# Patient Record
Sex: MALE | Race: WHITE | NOT HISPANIC OR LATINO | Employment: OTHER
[De-identification: names, ages, dates, MRNs, and addresses within clinical notes are randomized per-mention and may not be internally consistent; named-entity substitution may affect disease eponyms.]

---

## 2017-03-19 ENCOUNTER — HOSPITAL (OUTPATIENT)
Dept: OTHER | Age: 72
End: 2017-03-19
Attending: EMERGENCY MEDICINE

## 2017-03-19 LAB
ALBUMIN SERPL-MCNC: 3.7 GM/DL (ref 3.6–5.1)
ALP SERPL-CCNC: 120 UNIT/L (ref 45–117)
ALT SERPL-CCNC: 41 UNIT/L
ANALYZER ANC (IANC): NORMAL
ANION GAP SERPL CALC-SCNC: 8 MMOL/L (ref 10–20)
AST SERPL-CCNC: 38 UNIT/L
BASOPHILS # BLD: 0.1 THOUSAND/MCL (ref 0–0.3)
BASOPHILS NFR BLD: 2 %
BILIRUB CONJ SERPL-MCNC: 0.1 MG/DL (ref 0–0.2)
BILIRUB SERPL-MCNC: 0.6 MG/DL (ref 0.2–1)
BUN SERPL-MCNC: 11 MG/DL (ref 6–20)
BUN/CREAT SERPL: 12 (ref 7–25)
CALCIUM SERPL-MCNC: 9.1 MG/DL (ref 8.4–10.2)
CHLORIDE: 105 MMOL/L (ref 98–107)
CO2 SERPL-SCNC: 32 MMOL/L (ref 21–32)
CREAT SERPL-MCNC: 0.89 MG/DL (ref 0.67–1.17)
DIFFERENTIAL METHOD BLD: NORMAL
EOSINOPHIL # BLD: 0.1 THOUSAND/MCL (ref 0.1–0.5)
EOSINOPHIL NFR BLD: 2 %
ERYTHROCYTE [DISTWIDTH] IN BLOOD: 13.8 % (ref 11–15)
GLUCOSE SERPL-MCNC: 93 MG/DL (ref 65–99)
HEMATOCRIT: 43.8 % (ref 39–51)
HGB BLD-MCNC: 15 GM/DL (ref 13–17)
LYMPHOCYTES # BLD: 1.1 THOUSAND/MCL (ref 1–4)
LYMPHOCYTES NFR BLD: 24 %
MCH RBC QN AUTO: 30.3 PG (ref 26–34)
MCHC RBC AUTO-ENTMCNC: 34.2 GM/DL (ref 32–36.5)
MCV RBC AUTO: 88.5 FL (ref 78–100)
MONOCYTES # BLD: 0.4 THOUSAND/MCL (ref 0.3–0.9)
MONOCYTES NFR BLD: 9 %
NEUTROPHILS # BLD: 3 THOUSAND/MCL (ref 1.8–7.7)
NEUTROPHILS NFR BLD: 63 %
NEUTS SEG NFR BLD: NORMAL %
PERCENT NRBC: NORMAL
PLATELET # BLD: 278 THOUSAND/MCL (ref 140–450)
POTASSIUM SERPL-SCNC: 3.2 MMOL/L (ref 3.4–5.1)
PROT SERPL-MCNC: 7.8 GM/DL (ref 6.4–8.2)
RBC # BLD: 4.95 MILLION/MCL (ref 4.5–5.9)
SODIUM SERPL-SCNC: 142 MMOL/L (ref 135–145)
WBC # BLD: 4.7 THOUSAND/MCL (ref 4.2–11)

## 2017-03-20 LAB
AMORPH SED URNS QL MICRO: NORMAL
APPEARANCE UR: CLEAR
BACTERIA #/AREA URNS HPF: NORMAL /HPF
BILIRUB UR QL: NEGATIVE
CAOX CRY URNS QL MICRO: NORMAL
COLOR UR: YELLOW
EPITH CASTS #/AREA URNS LPF: NORMAL /[LPF]
FATTY CASTS #/AREA URNS LPF: NORMAL /[LPF]
GLUCOSE UR-MCNC: NEGATIVE MG/DL
GRAN CASTS #/AREA URNS LPF: NORMAL /[LPF]
HGB UR QL: NEGATIVE
HYALINE CASTS #/AREA URNS LPF: NORMAL /LPF (ref 0–5)
KETONES UR-MCNC: NEGATIVE MG/DL
LEUKOCYTE ESTERASE UR QL STRIP: NEGATIVE
MIXED CELL CASTS #/AREA URNS LPF: NORMAL /[LPF]
MUCOUS THREADS URNS QL MICRO: PRESENT
NITRITE UR QL: NEGATIVE
PH UR: 6 UNIT (ref 5–7)
PROT UR QL: NEGATIVE MG/DL
RBC #/AREA URNS HPF: NORMAL /HPF (ref 0–3)
RBC CASTS #/AREA URNS LPF: NORMAL /[LPF]
RENAL EPI CELLS #/AREA URNS HPF: NORMAL /[HPF]
SP GR UR: 1 (ref 1–1.03)
SPECIMEN SOURCE: NORMAL
SPERM URNS QL MICRO: NORMAL
SQUAMOUS #/AREA URNS HPF: NORMAL /HPF (ref 0–5)
T VAGINALIS URNS QL MICRO: NORMAL
TRI-PHOS CRY URNS QL MICRO: NORMAL
URATE CRY URNS QL MICRO: NORMAL
URINE REFLEX: NORMAL
URNS CMNT MICRO: NORMAL
UROBILINOGEN UR QL: 0.2 MG/DL (ref 0–1)
WAXY CASTS #/AREA URNS LPF: NORMAL /[LPF]
WBC #/AREA URNS HPF: NORMAL /HPF (ref 0–5)
WBC CASTS #/AREA URNS LPF: NORMAL /[LPF]
YEAST HYPHAE URNS QL MICRO: NORMAL
YEAST URNS QL MICRO: NORMAL

## 2020-01-05 ENCOUNTER — HOSPITAL (OUTPATIENT)
Dept: OTHER | Age: 75
End: 2020-01-05

## 2020-01-05 LAB
ANALYZER ANC (IANC): ABNORMAL
ANION GAP SERPL CALC-SCNC: 5 MMOL/L (ref 10–20)
BASOPHILS # BLD: 0.1 K/MCL (ref 0–0.3)
BASOPHILS NFR BLD: 1 %
BUN SERPL-MCNC: 8 MG/DL (ref 6–20)
BUN/CREAT SERPL: 9 (ref 7–25)
CALCIUM SERPL-MCNC: 8.2 MG/DL (ref 8.4–10.2)
CHLORIDE SERPL-SCNC: 108 MMOL/L (ref 98–107)
CO2 SERPL-SCNC: 32 MMOL/L (ref 21–32)
CREAT SERPL-MCNC: 0.84 MG/DL (ref 0.67–1.17)
DIFFERENTIAL METHOD BLD: ABNORMAL
EOSINOPHIL # BLD: 0.2 K/MCL (ref 0.1–0.5)
EOSINOPHIL NFR BLD: 4 %
ERYTHROCYTE [DISTWIDTH] IN BLOOD: 13.5 % (ref 11–15)
GLUCOSE SERPL-MCNC: 88 MG/DL (ref 65–99)
HCT VFR BLD CALC: 40.1 % (ref 39–51)
HGB BLD-MCNC: 12.7 G/DL (ref 13–17)
IMM GRANULOCYTES # BLD AUTO: 0 K/MCL (ref 0–0.2)
IMM GRANULOCYTES NFR BLD: 1 %
LYMPHOCYTES # BLD: 1.1 K/MCL (ref 1–4)
LYMPHOCYTES NFR BLD: 18 %
MCH RBC QN AUTO: 28.7 PG (ref 26–34)
MCHC RBC AUTO-ENTMCNC: 31.7 G/DL (ref 32–36.5)
MCV RBC AUTO: 90.7 FL (ref 78–100)
MONOCYTES # BLD: 0.6 K/MCL (ref 0.3–0.9)
MONOCYTES NFR BLD: 9 %
NEUTROPHILS # BLD: 3.9 K/MCL (ref 1.8–7.7)
NEUTROPHILS NFR BLD: 67 %
NEUTS SEG NFR BLD: ABNORMAL %
NRBC (NRBCRE): 0 /100 WBC
PLATELET # BLD: 225 K/MCL (ref 140–450)
POTASSIUM SERPL-SCNC: 3.8 MMOL/L (ref 3.4–5.1)
RBC # BLD: 4.42 MIL/MCL (ref 4.5–5.9)
SODIUM SERPL-SCNC: 141 MMOL/L (ref 135–145)
WBC # BLD: 5.9 K/MCL (ref 4.2–11)

## 2020-01-06 ENCOUNTER — HOSPITAL (OUTPATIENT)
Dept: OTHER | Age: 75
End: 2020-01-06

## 2020-01-06 LAB
ANALYZER ANC (IANC): ABNORMAL
BASOPHILS # BLD: 0.1 K/MCL (ref 0–0.3)
BASOPHILS NFR BLD: 1 %
CREAT SERPL-MCNC: 0.85 MG/DL (ref 0.67–1.17)
DIFFERENTIAL METHOD BLD: ABNORMAL
EOSINOPHIL # BLD: 0.2 K/MCL (ref 0.1–0.5)
EOSINOPHIL NFR BLD: 5 %
ERYTHROCYTE [DISTWIDTH] IN BLOOD: 13.6 % (ref 11–15)
ERYTHROCYTE [SEDIMENTATION RATE] IN BLOOD BY WESTERGREN METHOD: 30 MM/HR (ref 0–20)
HCT VFR BLD CALC: 36.9 % (ref 39–51)
HGB BLD-MCNC: 11.7 G/DL (ref 13–17)
IMM GRANULOCYTES # BLD AUTO: 0 K/MCL (ref 0–0.2)
IMM GRANULOCYTES NFR BLD: 0 %
LYMPHOCYTES # BLD: 0.8 K/MCL (ref 1–4)
LYMPHOCYTES NFR BLD: 17 %
MAGNESIUM SERPL-MCNC: 2.2 MG/DL (ref 1.7–2.4)
MCH RBC QN AUTO: 28.3 PG (ref 26–34)
MCHC RBC AUTO-ENTMCNC: 31.7 G/DL (ref 32–36.5)
MCV RBC AUTO: 89.1 FL (ref 78–100)
MONOCYTES # BLD: 0.6 K/MCL (ref 0.3–0.9)
MONOCYTES NFR BLD: 12 %
MRSA DNA SPEC QL NAA+PROBE: NORMAL
MRSA DNA SPEC QL NAA+PROBE: NORMAL
MRSA DNA SPEC QL NAA+PROBE: NOT DETECTED
NEUTROPHILS # BLD: 3.3 K/MCL (ref 1.8–7.7)
NEUTROPHILS NFR BLD: 65 %
NEUTS SEG NFR BLD: ABNORMAL %
NRBC (NRBCRE): 0 /100 WBC
PLATELET # BLD: 192 K/MCL (ref 140–450)
POTASSIUM SERPL-SCNC: 3.8 MMOL/L (ref 3.4–5.1)
RBC # BLD: 4.14 MIL/MCL (ref 4.5–5.9)
SPECIMEN SOURCE: NORMAL
WBC # BLD: 5 K/MCL (ref 4.2–11)

## 2020-01-07 LAB
CREAT SERPL-MCNC: 0.67 MG/DL (ref 0.67–1.17)
MAGNESIUM SERPL-MCNC: 2 MG/DL (ref 1.7–2.4)
POTASSIUM SERPL-SCNC: 3.8 MMOL/L (ref 3.4–5.1)

## 2020-01-07 PROCEDURE — 90792 PSYCH DIAG EVAL W/MED SRVCS: CPT | Performed by: PSYCHIATRY & NEUROLOGY

## 2020-01-08 LAB
CREAT SERPL-MCNC: 0.83 MG/DL (ref 0.67–1.17)
MAGNESIUM SERPL-MCNC: 2.2 MG/DL (ref 1.7–2.4)
POTASSIUM SERPL-SCNC: 3.9 MMOL/L (ref 3.4–5.1)

## 2020-01-09 LAB
25(OH)D3+25(OH)D2 SERPL-MCNC: 16.4 NG/ML (ref 30–100)
25(OH)D3+25(OH)D2 SERPL-MCNC: ABNORMAL NG/ML
FOLATE SERPL-MCNC: 12.2 NG/ML
T4 FREE SERPL-MCNC: 1 NG/DL (ref 0.8–1.5)
TSH SERPL-ACNC: 2.77 MCUNITS/ML (ref 0.35–5)
VIT B12 SERPL-MCNC: 504 PG/ML (ref 211–911)

## 2020-01-09 PROCEDURE — 99232 SBSQ HOSP IP/OBS MODERATE 35: CPT | Performed by: PSYCHIATRY & NEUROLOGY

## 2020-01-10 LAB
ALBUMIN SERPL-MCNC: 2.1 G/DL (ref 3.6–5.1)
ALBUMIN/GLOB SERPL: 0.5 {RATIO} (ref 1–2.4)
ALP SERPL-CCNC: 101 UNITS/L (ref 45–117)
ALT SERPL-CCNC: 27 UNITS/L
ANALYZER ANC (IANC): ABNORMAL
ANION GAP SERPL CALC-SCNC: 6 MMOL/L (ref 10–20)
AST SERPL-CCNC: 38 UNITS/L
BACTERIA BLD CULT: NORMAL
BASOPHILS # BLD: 0.1 K/MCL (ref 0–0.3)
BASOPHILS NFR BLD: 1 %
BILIRUB SERPL-MCNC: 0.3 MG/DL (ref 0.2–1)
BUN SERPL-MCNC: 23 MG/DL (ref 6–20)
BUN/CREAT SERPL: 29 (ref 7–25)
CALCIUM SERPL-MCNC: 8.5 MG/DL (ref 8.4–10.2)
CHLORIDE SERPL-SCNC: 105 MMOL/L (ref 98–107)
CO2 SERPL-SCNC: 30 MMOL/L (ref 21–32)
CREAT SERPL-MCNC: 0.81 MG/DL (ref 0.67–1.17)
DIFFERENTIAL METHOD BLD: ABNORMAL
EOSINOPHIL # BLD: 0.3 K/MCL (ref 0.1–0.5)
EOSINOPHIL NFR BLD: 5 %
ERYTHROCYTE [DISTWIDTH] IN BLOOD: 13.7 % (ref 11–15)
GLOBULIN SER-MCNC: 4 G/DL (ref 2–4)
GLUCOSE SERPL-MCNC: 100 MG/DL (ref 65–99)
HCT VFR BLD CALC: 36.7 % (ref 39–51)
HGB BLD-MCNC: 11.7 G/DL (ref 13–17)
IMM GRANULOCYTES # BLD AUTO: 0 K/MCL (ref 0–0.2)
IMM GRANULOCYTES NFR BLD: 0 %
LYMPHOCYTES # BLD: 1 K/MCL (ref 1–4)
LYMPHOCYTES NFR BLD: 15 %
MCH RBC QN AUTO: 28.8 PG (ref 26–34)
MCHC RBC AUTO-ENTMCNC: 31.9 G/DL (ref 32–36.5)
MCV RBC AUTO: 90.4 FL (ref 78–100)
MONOCYTES # BLD: 0.8 K/MCL (ref 0.3–0.9)
MONOCYTES NFR BLD: 11 %
NEUTROPHILS # BLD: 4.6 K/MCL (ref 1.8–7.7)
NEUTROPHILS NFR BLD: 68 %
NEUTS SEG NFR BLD: ABNORMAL %
NRBC (NRBCRE): 0 /100 WBC
PLATELET # BLD: 202 K/MCL (ref 140–450)
POTASSIUM SERPL-SCNC: 4.1 MMOL/L (ref 3.4–5.1)
PROT SERPL-MCNC: 6.1 G/DL (ref 6.4–8.2)
RBC # BLD: 4.06 MIL/MCL (ref 4.5–5.9)
SODIUM SERPL-SCNC: 137 MMOL/L (ref 135–145)
WBC # BLD: 6.7 K/MCL (ref 4.2–11)

## 2020-01-10 PROCEDURE — 99232 SBSQ HOSP IP/OBS MODERATE 35: CPT | Performed by: PSYCHIATRY & NEUROLOGY

## 2020-02-22 ENCOUNTER — HOSPITAL ENCOUNTER (INPATIENT)
Age: 75
LOS: 4 days | Discharge: NURSING, REHAB OR LONG-TERM CARE | DRG: 871 | End: 2020-02-26
Attending: EMERGENCY MEDICINE | Admitting: HOSPITALIST

## 2020-02-22 ENCOUNTER — APPOINTMENT (OUTPATIENT)
Dept: ULTRASOUND IMAGING | Age: 75
DRG: 871 | End: 2020-02-22
Attending: HOSPITALIST

## 2020-02-22 ENCOUNTER — APPOINTMENT (OUTPATIENT)
Dept: CT IMAGING | Age: 75
DRG: 871 | End: 2020-02-22
Attending: EMERGENCY MEDICINE

## 2020-02-22 ENCOUNTER — APPOINTMENT (OUTPATIENT)
Dept: GENERAL RADIOLOGY | Age: 75
DRG: 871 | End: 2020-02-22
Attending: EMERGENCY MEDICINE

## 2020-02-22 DIAGNOSIS — G30.8 ALZHEIMER'S DISEASE OF OTHER ONSET WITHOUT BEHAVIORAL DISTURBANCE: Chronic | ICD-10-CM

## 2020-02-22 DIAGNOSIS — L03.115 CELLULITIS OF RIGHT LOWER EXTREMITY: Primary | ICD-10-CM

## 2020-02-22 DIAGNOSIS — J10.1 INFLUENZA A WITH RESPIRATORY MANIFESTATIONS: ICD-10-CM

## 2020-02-22 DIAGNOSIS — J69.0 ASPIRATION PNEUMONIA OF BOTH LOWER LOBES DUE TO REGURGITATED FOOD (CMD): ICD-10-CM

## 2020-02-22 DIAGNOSIS — R65.20 SEVERE SEPSIS (CMD): ICD-10-CM

## 2020-02-22 DIAGNOSIS — A41.9 SEVERE SEPSIS (CMD): ICD-10-CM

## 2020-02-22 DIAGNOSIS — F02.80 ALZHEIMER'S DISEASE OF OTHER ONSET WITHOUT BEHAVIORAL DISTURBANCE: Chronic | ICD-10-CM

## 2020-02-22 LAB
ALBUMIN SERPL-MCNC: 3 G/DL (ref 3.6–5.1)
ALBUMIN/GLOB SERPL: 0.7 {RATIO} (ref 1–2.4)
ALP SERPL-CCNC: 117 UNITS/L (ref 45–117)
ALT SERPL-CCNC: 80 UNITS/L
ANION GAP SERPL CALC-SCNC: 13 MMOL/L (ref 10–20)
APPEARANCE UR: ABNORMAL
APTT PPP: 30 SEC (ref 22–32)
AST SERPL-CCNC: 99 UNITS/L
BACTERIA #/AREA URNS HPF: ABNORMAL /HPF
BASE DEFICIT BLDV-SCNC: 1 MMOL/L (ref 0–2)
BASOPHILS # BLD AUTO: 0 K/MCL (ref 0–0.3)
BASOPHILS NFR BLD AUTO: 0 %
BILIRUB SERPL-MCNC: 0.5 MG/DL (ref 0.2–1)
BILIRUB UR QL STRIP: NEGATIVE
BODY TEMPERATURE: 37 DEGREES
BUN SERPL-MCNC: 21 MG/DL (ref 6–20)
BUN/CREAT SERPL: 21 (ref 7–25)
CALCIUM SERPL-MCNC: 8.4 MG/DL (ref 8.4–10.2)
CHLORIDE SERPL-SCNC: 106 MMOL/L (ref 98–107)
CO2 SERPL-SCNC: 24 MMOL/L (ref 21–32)
COHGB MFR BLD: 1.2 %
COLOR UR: YELLOW
CONDITION-RC: ABNORMAL
CREAT SERPL-MCNC: 1.02 MG/DL (ref 0.67–1.17)
DIFFERENTIAL METHOD BLD: ABNORMAL
EOSINOPHIL # BLD AUTO: 0 K/MCL (ref 0.1–0.5)
EOSINOPHIL NFR SPEC: 0 %
ERYTHROCYTE [DISTWIDTH] IN BLOOD: 13.7 % (ref 11–15)
GLOBULIN SER-MCNC: 4.3 G/DL (ref 2–4)
GLUCOSE BLD-MCNC: 114 MG/DL (ref 65–99)
GLUCOSE SERPL-MCNC: 113 MG/DL (ref 65–99)
GLUCOSE UR STRIP-MCNC: NEGATIVE MG/DL
HCO3 BLDV-SCNC: 24 MMOL/L (ref 22–28)
HCT VFR BLD CALC: 41 % (ref 39–51)
HCT VFR BLD CALC: 41 % (ref 39–51)
HGB BLD-MCNC: 13.3 G/DL (ref 13–17)
HGB BLD-MCNC: 13.8 G/DL (ref 13–17)
HGB UR QL STRIP: ABNORMAL
HYALINE CASTS #/AREA URNS LPF: ABNORMAL /LPF (ref 0–5)
IMM GRANULOCYTES # BLD AUTO: 0.1 K/MCL (ref 0–0.2)
IMM GRANULOCYTES NFR BLD: 1 %
INR PPP: 1.1
KETONES UR STRIP-MCNC: NEGATIVE MG/DL
LACTATE BLDV-MCNC: 2.9 MMOL/L
LACTATE BLDV-MCNC: 5.5 MMOL/L
LEUKOCYTE ESTERASE UR QL STRIP: NEGATIVE
LYMPHOCYTES # BLD MANUAL: 0.4 K/MCL (ref 1–4)
LYMPHOCYTES NFR BLD MANUAL: 4 %
MCH RBC QN AUTO: 28.8 PG (ref 26–34)
MCHC RBC AUTO-ENTMCNC: 32.4 G/DL (ref 32–36.5)
MCV RBC AUTO: 88.7 FL (ref 78–100)
METHGB MFR BLD: 1 %
MONOCYTES # BLD MANUAL: 0.9 K/MCL (ref 0.3–0.9)
MONOCYTES NFR BLD MANUAL: 10 %
MUCOUS THREADS URNS QL MICRO: PRESENT
NEUTROPHILS # BLD: 7.9 K/MCL (ref 1.8–7.7)
NEUTROPHILS NFR BLD AUTO: 85 %
NITRITE UR QL STRIP: NEGATIVE
NRBC BLD MANUAL-RTO: 0 /100 WBC
NT-PROBNP SERPL-MCNC: 893 PG/ML
OXYHGB MFR BLD: 55.1 % (ref 94–98)
PCO2 BLDV: 41 MM HG (ref 41–54)
PH BLDV: 7.38 UNITS (ref 7.35–7.45)
PH UR STRIP: 5 UNITS (ref 5–7)
PLATELET # BLD: 150 K/MCL (ref 140–450)
PO2 BLDV: 33 MM HG (ref 35–42)
POTASSIUM BLD-SCNC: 3.9 MMOL/L (ref 3.4–5.1)
POTASSIUM SERPL-SCNC: 3.7 MMOL/L (ref 3.4–5.1)
PROT SERPL-MCNC: 7.3 G/DL (ref 6.4–8.2)
PROT UR STRIP-MCNC: 30 MG/DL
PROTHROMBIN TIME: 11.9 SEC (ref 9.7–11.8)
RBC # BLD: 4.62 MIL/MCL (ref 4.5–5.9)
RBC #/AREA URNS HPF: ABNORMAL /HPF (ref 0–2)
SAO2 % BLDV: 56 % (ref 60–80)
SAO2% DEVICE MEASUREMENT SITE: ABNORMAL
SODIUM BLD-SCNC: 139 MMOL/L (ref 135–145)
SODIUM SERPL-SCNC: 139 MMOL/L (ref 135–145)
SP GR UR STRIP: 1.02 (ref 1–1.03)
SPECIMEN SOURCE: ABNORMAL
SQUAMOUS #/AREA URNS HPF: ABNORMAL /HPF (ref 0–5)
TROPONIN I SERPL-MCNC: <0.02 NG/ML
UROBILINOGEN UR STRIP-MCNC: 0.2 MG/DL (ref 0–1)
WBC # BLD: 9.2 K/MCL (ref 4.2–11)
WBC #/AREA URNS HPF: ABNORMAL /HPF (ref 0–5)

## 2020-02-22 PROCEDURE — 36415 COLL VENOUS BLD VENIPUNCTURE: CPT

## 2020-02-22 PROCEDURE — 96374 THER/PROPH/DIAG INJ IV PUSH: CPT

## 2020-02-22 PROCEDURE — 82947 ASSAY GLUCOSE BLOOD QUANT: CPT

## 2020-02-22 PROCEDURE — 10002800 HB RX 250 W HCPCS

## 2020-02-22 PROCEDURE — 93005 ELECTROCARDIOGRAM TRACING: CPT | Performed by: EMERGENCY MEDICINE

## 2020-02-22 PROCEDURE — 84295 ASSAY OF SERUM SODIUM: CPT

## 2020-02-22 PROCEDURE — 10002803 HB RX 637: Performed by: EMERGENCY MEDICINE

## 2020-02-22 PROCEDURE — 84132 ASSAY OF SERUM POTASSIUM: CPT

## 2020-02-22 PROCEDURE — 71045 X-RAY EXAM CHEST 1 VIEW: CPT

## 2020-02-22 PROCEDURE — 96375 TX/PRO/DX INJ NEW DRUG ADDON: CPT

## 2020-02-22 PROCEDURE — 84484 ASSAY OF TROPONIN QUANT: CPT

## 2020-02-22 PROCEDURE — 80053 COMPREHEN METABOLIC PANEL: CPT

## 2020-02-22 PROCEDURE — 10002800 HB RX 250 W HCPCS: Performed by: EMERGENCY MEDICINE

## 2020-02-22 PROCEDURE — 87040 BLOOD CULTURE FOR BACTERIA: CPT

## 2020-02-22 PROCEDURE — 99292 CRITICAL CARE ADDL 30 MIN: CPT | Performed by: HOSPITALIST

## 2020-02-22 PROCEDURE — 81001 URINALYSIS AUTO W/SCOPE: CPT

## 2020-02-22 PROCEDURE — 85610 PROTHROMBIN TIME: CPT

## 2020-02-22 PROCEDURE — 99291 CRITICAL CARE FIRST HOUR: CPT

## 2020-02-22 PROCEDURE — 70450 CT HEAD/BRAIN W/O DYE: CPT

## 2020-02-22 PROCEDURE — 83605 ASSAY OF LACTIC ACID: CPT

## 2020-02-22 PROCEDURE — 73070 X-RAY EXAM OF ELBOW: CPT

## 2020-02-22 PROCEDURE — 85025 COMPLETE CBC W/AUTO DIFF WBC: CPT

## 2020-02-22 PROCEDURE — 10003585 HB ROOM CHARGE INTERMEDIATE CARE

## 2020-02-22 PROCEDURE — 96361 HYDRATE IV INFUSION ADD-ON: CPT

## 2020-02-22 PROCEDURE — 93971 EXTREMITY STUDY: CPT

## 2020-02-22 PROCEDURE — 85730 THROMBOPLASTIN TIME PARTIAL: CPT

## 2020-02-22 PROCEDURE — 83050 HGB METHEMOGLOBIN QUAN: CPT

## 2020-02-22 PROCEDURE — 10004281 HB COUNTER-STAFF TIME PER 15 MIN

## 2020-02-22 PROCEDURE — 83880 ASSAY OF NATRIURETIC PEPTIDE: CPT

## 2020-02-22 PROCEDURE — 10002807 HB RX 258: Performed by: EMERGENCY MEDICINE

## 2020-02-22 RX ORDER — ERGOCALCIFEROL 1.25 MG/1
1.25 CAPSULE ORAL
COMMUNITY

## 2020-02-22 RX ORDER — CEFAZOLIN SODIUM/WATER 1 G/10 ML
1000 SYRINGE (ML) INTRAVENOUS DAILY
Status: DISCONTINUED | OUTPATIENT
Start: 2020-02-23 | End: 2020-02-23

## 2020-02-22 RX ORDER — ONDANSETRON 2 MG/ML
4 INJECTION INTRAMUSCULAR; INTRAVENOUS EVERY 6 HOURS PRN
Status: DISCONTINUED | OUTPATIENT
Start: 2020-02-22 | End: 2020-02-26 | Stop reason: HOSPADM

## 2020-02-22 RX ORDER — ONDANSETRON 2 MG/ML
4 INJECTION INTRAMUSCULAR; INTRAVENOUS ONCE
Status: COMPLETED | OUTPATIENT
Start: 2020-02-22 | End: 2020-02-22

## 2020-02-22 RX ORDER — ONDANSETRON 2 MG/ML
INJECTION INTRAMUSCULAR; INTRAVENOUS
Status: COMPLETED
Start: 2020-02-22 | End: 2020-02-22

## 2020-02-22 RX ORDER — ACETAMINOPHEN 500 MG
1000 TABLET ORAL ONCE
Status: DISCONTINUED | OUTPATIENT
Start: 2020-02-22 | End: 2020-02-22

## 2020-02-22 RX ORDER — ACETAMINOPHEN 325 MG/1
650 TABLET ORAL EVERY 4 HOURS PRN
Status: DISCONTINUED | OUTPATIENT
Start: 2020-02-22 | End: 2020-02-26 | Stop reason: HOSPADM

## 2020-02-22 RX ORDER — CEFAZOLIN SODIUM/WATER 2 G/20 ML
2000 SYRINGE (ML) INTRAVENOUS ONCE
Status: COMPLETED | OUTPATIENT
Start: 2020-02-22 | End: 2020-02-22

## 2020-02-22 RX ORDER — 0.9 % SODIUM CHLORIDE 0.9 %
2 VIAL (ML) INJECTION EVERY 12 HOURS SCHEDULED
Status: DISCONTINUED | OUTPATIENT
Start: 2020-02-22 | End: 2020-02-26 | Stop reason: HOSPADM

## 2020-02-22 RX ORDER — ENOXAPARIN SODIUM 100 MG/ML
40 INJECTION SUBCUTANEOUS DAILY
Status: DISCONTINUED | OUTPATIENT
Start: 2020-02-23 | End: 2020-02-26 | Stop reason: HOSPADM

## 2020-02-22 RX ORDER — ACETAMINOPHEN 650 MG/1
650 SUPPOSITORY RECTAL ONCE
Status: COMPLETED | OUTPATIENT
Start: 2020-02-22 | End: 2020-02-22

## 2020-02-22 RX ORDER — POLYETHYLENE GLYCOL 3350 17 G/17G
17 POWDER, FOR SOLUTION ORAL DAILY
Status: DISCONTINUED | OUTPATIENT
Start: 2020-02-23 | End: 2020-02-26 | Stop reason: HOSPADM

## 2020-02-22 RX ORDER — PANTOPRAZOLE SODIUM 40 MG/1
40 TABLET, DELAYED RELEASE ORAL DAILY
COMMUNITY

## 2020-02-22 RX ORDER — SODIUM CHLORIDE 9 MG/ML
INJECTION, SOLUTION INTRAVENOUS CONTINUOUS
Status: DISCONTINUED | OUTPATIENT
Start: 2020-02-22 | End: 2020-02-24

## 2020-02-22 RX ADMIN — ACETAMINOPHEN 650 MG: 650 SUPPOSITORY RECTAL at 15:56

## 2020-02-22 RX ADMIN — ONDANSETRON 4 MG: 2 INJECTION INTRAMUSCULAR; INTRAVENOUS at 15:39

## 2020-02-22 RX ADMIN — CEFTRIAXONE SODIUM 2000 MG: 100 INJECTION, POWDER, FOR SOLUTION INTRAVENOUS at 15:38

## 2020-02-22 RX ADMIN — SODIUM CHLORIDE, SODIUM LACTATE, POTASSIUM CHLORIDE, AND CALCIUM CHLORIDE 500 ML: .6; .31; .03; .02 INJECTION, SOLUTION INTRAVENOUS at 17:51

## 2020-02-22 RX ADMIN — ACETAMINOPHEN 325 MG: 325 SUPPOSITORY RECTAL at 15:56

## 2020-02-22 RX ADMIN — SODIUM CHLORIDE, POTASSIUM CHLORIDE, SODIUM LACTATE AND CALCIUM CHLORIDE 1000 ML: 600; 310; 30; 20 INJECTION, SOLUTION INTRAVENOUS at 17:00

## 2020-02-22 RX ADMIN — SODIUM CHLORIDE, SODIUM LACTATE, POTASSIUM CHLORIDE, AND CALCIUM CHLORIDE 1000 ML: .6; .31; .03; .02 INJECTION, SOLUTION INTRAVENOUS at 15:38

## 2020-02-22 SDOH — HEALTH STABILITY: MENTAL HEALTH: HOW OFTEN DO YOU HAVE A DRINK CONTAINING ALCOHOL?: NEVER

## 2020-02-22 ASSESSMENT — PAIN SCALES - PAIN ASSESSMENT IN ADVANCED DEMENTIA (PAINAD)
FACIALEXPRESSION: SMILING OR INEXPRESSIVE
BODYLANGUAGE: RELAXED
TOTALSCORE: 0
CONSOLABILITY: NO NEED TO CONSOLE
BREATHING: NORMAL

## 2020-02-22 ASSESSMENT — LIFESTYLE VARIABLES
AUDIT-C TOTAL SCORE: 0
HOW MANY STANDARD DRINKS CONTAINING ALCOHOL DO YOU HAVE ON A TYPICAL DAY: 0,1 OR 2
ALCOHOL_USE_STATUS: NO OR LOW RISK WITH VALIDATED TOOL
HOW OFTEN DO YOU HAVE A DRINK CONTAINING ALCOHOL: NEVER
HOW OFTEN DO YOU HAVE 6 OR MORE DRINKS ON ONE OCCASION: NEVER

## 2020-02-22 ASSESSMENT — ACTIVITIES OF DAILY LIVING (ADL)
ADL_SCORE: 6
DRESSING YOURSELF: NEEDS ASSISTANCE
CHRONIC_PAIN_PRESENT: UNABLE TO ASSESS
TRANSFERRING: NEEDS ASSISTANCE
MOBILITY_ASSIST_DEVICES: TRANSFER MAT/PAD/SHEET
TOILETING: NEEDS ASSISTANCE
CONTINENCE: NEEDS ASSISTANCE
FEEDING YOURSELF: NEEDS ASSISTANCE
ADL_BEFORE_ADMISSION: NEEDS/REQUIRES ASSISTANCE
RECENT_DECLINE_ADL: YES, DECLINE IN AMBULATION/TRANSFERRING, COLLABORATE WITH PROVIDER (T)
BATHING: NEEDS ASSISTANCE
ADL_SHORT_OF_BREATH: NO

## 2020-02-22 ASSESSMENT — COGNITIVE AND FUNCTIONAL STATUS - GENERAL
ARE YOU DEAF OR DO YOU HAVE SERIOUS DIFFICULTY  HEARING: NO
ARE YOU BLIND OR DO YOU HAVE SERIOUS DIFFICULTY SEEING, EVEN WHEN WEARING GLASSES: NO

## 2020-02-22 ASSESSMENT — PATIENT HEALTH QUESTIONNAIRE - PHQ9: IS PATIENT ABLE TO COMPLETE PHQ2 OR PHQ9: NO, DEFER TO LATER TIME

## 2020-02-23 ENCOUNTER — APPOINTMENT (OUTPATIENT)
Dept: CT IMAGING | Age: 75
DRG: 871 | End: 2020-02-23
Attending: HOSPITALIST

## 2020-02-23 PROBLEM — J69.0 ASPIRATION PNEUMONIA (CMD): Status: ACTIVE | Noted: 2020-02-23

## 2020-02-23 PROBLEM — R65.20 SEVERE SEPSIS (CMD): Status: ACTIVE | Noted: 2020-02-23

## 2020-02-23 PROBLEM — R74.01 TRANSAMINITIS: Status: ACTIVE | Noted: 2020-02-23

## 2020-02-23 PROBLEM — A41.9 SEVERE SEPSIS (CMD): Status: ACTIVE | Noted: 2020-02-23

## 2020-02-23 PROBLEM — E86.0 DEHYDRATION: Status: ACTIVE | Noted: 2020-02-23

## 2020-02-23 PROBLEM — G93.41 ACUTE METABOLIC ENCEPHALOPATHY: Status: ACTIVE | Noted: 2020-02-23

## 2020-02-23 PROBLEM — F03.90 DEMENTIA (CMD): Chronic | Status: ACTIVE | Noted: 2020-02-23

## 2020-02-23 PROBLEM — R13.10 DYSPHAGIA: Status: ACTIVE | Noted: 2020-02-23

## 2020-02-23 LAB
ALBUMIN SERPL-MCNC: 2.2 G/DL (ref 3.6–5.1)
ALP SERPL-CCNC: 86 UNITS/L (ref 45–117)
ALT SERPL-CCNC: 112 UNITS/L
ANION GAP SERPL CALC-SCNC: 10 MMOL/L (ref 10–20)
AST SERPL-CCNC: 438 UNITS/L
BASOPHILS # BLD AUTO: 0 K/MCL (ref 0–0.3)
BASOPHILS NFR BLD AUTO: 0 %
BILIRUB CONJ SERPL-MCNC: 0.1 MG/DL (ref 0–0.2)
BILIRUB SERPL-MCNC: 0.3 MG/DL (ref 0.2–1)
BUN SERPL-MCNC: 18 MG/DL (ref 6–20)
BUN/CREAT SERPL: 22 (ref 7–25)
CALCIUM SERPL-MCNC: 8.2 MG/DL (ref 8.4–10.2)
CHLORIDE SERPL-SCNC: 110 MMOL/L (ref 98–107)
CK SERPL-CCNC: ABNORMAL UNITS/L (ref 39–308)
CO2 SERPL-SCNC: 27 MMOL/L (ref 21–32)
CREAT SERPL-MCNC: 0.8 MG/DL (ref 0.67–1.17)
D DIMER PPP FEU-MCNC: 2.22 MG/L (FEU)
DIFFERENTIAL METHOD BLD: ABNORMAL
EOSINOPHIL # BLD AUTO: 0 K/MCL (ref 0.1–0.5)
EOSINOPHIL NFR SPEC: 0 %
ERYTHROCYTE [DISTWIDTH] IN BLOOD: 14 % (ref 11–15)
FLUAV RNA SPEC QL NAA+PROBE: DETECTED
FLUBV RNA SPEC QL NAA+PROBE: NOT DETECTED
GLUCOSE SERPL-MCNC: 93 MG/DL (ref 65–99)
HCT VFR BLD CALC: 35.2 % (ref 39–51)
HGB BLD-MCNC: 11.4 G/DL (ref 13–17)
IMM GRANULOCYTES # BLD AUTO: 0 K/MCL (ref 0–0.2)
IMM GRANULOCYTES NFR BLD: 1 %
LACTATE SERPL-SCNC: 1.1 MMOL/L (ref 0–2)
LYMPHOCYTES # BLD MANUAL: 0.8 K/MCL (ref 1–4)
LYMPHOCYTES NFR BLD MANUAL: 12 %
MCH RBC QN AUTO: 28.6 PG (ref 26–34)
MCHC RBC AUTO-ENTMCNC: 32.4 G/DL (ref 32–36.5)
MCV RBC AUTO: 88.2 FL (ref 78–100)
MONOCYTES # BLD MANUAL: 0.8 K/MCL (ref 0.3–0.9)
MONOCYTES NFR BLD MANUAL: 11 %
MRSA DNA SPEC QL NAA+PROBE: NOT DETECTED
NEUTROPHILS # BLD: 5.4 K/MCL (ref 1.8–7.7)
NEUTROPHILS NFR BLD AUTO: 76 %
NRBC BLD MANUAL-RTO: 0 /100 WBC
PLATELET # BLD: 102 K/MCL (ref 140–450)
POTASSIUM SERPL-SCNC: 3.6 MMOL/L (ref 3.4–5.1)
PROCALCITONIN SERPL-MCNC: 1.14 NG/ML
PROT SERPL-MCNC: 5.6 G/DL (ref 6.4–8.2)
RBC # BLD: 3.99 MIL/MCL (ref 4.5–5.9)
SODIUM SERPL-SCNC: 143 MMOL/L (ref 135–145)
SPECIMEN SOURCE: ABNORMAL
SPECIMEN SOURCE: NORMAL
WBC # BLD: 7 K/MCL (ref 4.2–11)

## 2020-02-23 PROCEDURE — 80076 HEPATIC FUNCTION PANEL: CPT

## 2020-02-23 PROCEDURE — 85025 COMPLETE CBC W/AUTO DIFF WBC: CPT

## 2020-02-23 PROCEDURE — 10003585 HB ROOM CHARGE INTERMEDIATE CARE

## 2020-02-23 PROCEDURE — 80048 BASIC METABOLIC PNL TOTAL CA: CPT

## 2020-02-23 PROCEDURE — 10004651 HB RX, NO CHARGE ITEM: Performed by: EMERGENCY MEDICINE

## 2020-02-23 PROCEDURE — 5A09357 ASSISTANCE WITH RESPIRATORY VENTILATION, LESS THAN 24 CONSECUTIVE HOURS, CONTINUOUS POSITIVE AIRWAY PRESSURE: ICD-10-PCS | Performed by: HOSPITALIST

## 2020-02-23 PROCEDURE — 87633 RESP VIRUS 12-25 TARGETS: CPT

## 2020-02-23 PROCEDURE — 87641 MR-STAPH DNA AMP PROBE: CPT

## 2020-02-23 PROCEDURE — 10002807 HB RX 258: Performed by: HOSPITALIST

## 2020-02-23 PROCEDURE — 10002805 HB CONTRAST AGENT: Performed by: HOSPITALIST

## 2020-02-23 PROCEDURE — 74177 CT ABD & PELVIS W/CONTRAST: CPT

## 2020-02-23 PROCEDURE — 84145 PROCALCITONIN (PCT): CPT

## 2020-02-23 PROCEDURE — 10002800 HB RX 250 W HCPCS: Performed by: HOSPITALIST

## 2020-02-23 PROCEDURE — 87502 INFLUENZA DNA AMP PROBE: CPT

## 2020-02-23 PROCEDURE — 10002807 HB RX 258

## 2020-02-23 PROCEDURE — 83605 ASSAY OF LACTIC ACID: CPT

## 2020-02-23 PROCEDURE — 82550 ASSAY OF CK (CPK): CPT

## 2020-02-23 PROCEDURE — 85379 FIBRIN DEGRADATION QUANT: CPT

## 2020-02-23 PROCEDURE — 92610 EVALUATE SWALLOWING FUNCTION: CPT

## 2020-02-23 PROCEDURE — 36415 COLL VENOUS BLD VENIPUNCTURE: CPT

## 2020-02-23 RX ORDER — OSELTAMIVIR PHOSPHATE 75 MG/1
75 CAPSULE ORAL EVERY 12 HOURS SCHEDULED
Status: DISCONTINUED | OUTPATIENT
Start: 2020-02-23 | End: 2020-02-26 | Stop reason: HOSPADM

## 2020-02-23 RX ORDER — SODIUM CHLORIDE 9 MG/ML
INJECTION, SOLUTION INTRAVENOUS
Status: COMPLETED
Start: 2020-02-23 | End: 2020-02-26

## 2020-02-23 RX ADMIN — IOHEXOL 100 ML: 350 INJECTION, SOLUTION INTRAVENOUS at 18:00

## 2020-02-23 RX ADMIN — SODIUM CHLORIDE: 0.9 INJECTION, SOLUTION INTRAVENOUS at 01:15

## 2020-02-23 RX ADMIN — SODIUM CHLORIDE: 900 INJECTION INTRAVENOUS at 17:00

## 2020-02-23 RX ADMIN — SODIUM CHLORIDE, PRESERVATIVE FREE 2 ML: 5 INJECTION INTRAVENOUS at 10:36

## 2020-02-23 RX ADMIN — PIPERACILLIN AND TAZOBACTAM 3.38 G: 3; .375 INJECTION, POWDER, LYOPHILIZED, FOR SOLUTION INTRAVENOUS at 22:11

## 2020-02-23 RX ADMIN — SODIUM CHLORIDE, PRESERVATIVE FREE 2 ML: 5 INJECTION INTRAVENOUS at 01:07

## 2020-02-23 RX ADMIN — PIPERACILLIN AND TAZOBACTAM 3.38 G: 3; .375 INJECTION, POWDER, LYOPHILIZED, FOR SOLUTION INTRAVENOUS at 17:26

## 2020-02-23 RX ADMIN — ENOXAPARIN SODIUM 40 MG: 40 INJECTION SUBCUTANEOUS at 09:20

## 2020-02-23 ASSESSMENT — PAIN SCALES - GENERAL
PAINLEVEL_OUTOF10: 0
PAINLEVEL_OUTOF10: 0

## 2020-02-24 ENCOUNTER — APPOINTMENT (OUTPATIENT)
Dept: CT IMAGING | Age: 75
DRG: 871 | End: 2020-02-24
Attending: HOSPITALIST

## 2020-02-24 PROBLEM — J10.1 INFLUENZA A WITH RESPIRATORY MANIFESTATIONS: Status: ACTIVE | Noted: 2020-02-24

## 2020-02-24 PROBLEM — K59.00 CONSTIPATION: Status: ACTIVE | Noted: 2020-02-24

## 2020-02-24 LAB
ALBUMIN SERPL-MCNC: 2.1 G/DL (ref 3.6–5.1)
ALBUMIN/GLOB SERPL: 0.7 {RATIO} (ref 1–2.4)
ALP SERPL-CCNC: 85 UNITS/L (ref 45–117)
ALT SERPL-CCNC: 140 UNITS/L
ANION GAP SERPL CALC-SCNC: 11 MMOL/L (ref 10–20)
ANNOTATION COMMENT IMP: NORMAL
ANNOTATION COMMENT IMP: NORMAL
AST SERPL-CCNC: 600 UNITS/L
ATRIAL RATE (BPM): 78
BASOPHILS # BLD AUTO: 0 K/MCL (ref 0–0.3)
BASOPHILS NFR BLD AUTO: 0 %
BILIRUB SERPL-MCNC: 0.5 MG/DL (ref 0.2–1)
BUN SERPL-MCNC: 16 MG/DL (ref 6–20)
BUN/CREAT SERPL: 21 (ref 7–25)
C PNEUM DNA SPEC QL NAA+PROBE: NOT DETECTED
CALCIUM SERPL-MCNC: 7.7 MG/DL (ref 8.4–10.2)
CHLORIDE SERPL-SCNC: 107 MMOL/L (ref 98–107)
CO2 SERPL-SCNC: 25 MMOL/L (ref 21–32)
CREAT SERPL-MCNC: 0.76 MG/DL (ref 0.67–1.17)
DIFFERENTIAL METHOD BLD: ABNORMAL
EOSINOPHIL # BLD AUTO: 0 K/MCL (ref 0.1–0.5)
EOSINOPHIL NFR SPEC: 0 %
ERYTHROCYTE [DISTWIDTH] IN BLOOD: 14.1 % (ref 11–15)
FLUAV H1 2009 PAND RNA NPH QL NAA+PROBE: POSITIVE
FLUAV H1 RNA NPH QL NAA+PROBE: NOT DETECTED
FLUAV H3 RNA NPH QL NAA+PROBE: NOT DETECTED
FLUAV RNA NPH QL NAA+PROBE: ABNORMAL
FLUBV RNA NPH QL NAA+PROBE: NOT DETECTED
GLOBULIN SER-MCNC: 3.2 G/DL (ref 2–4)
GLUCOSE SERPL-MCNC: 72 MG/DL (ref 65–99)
HADV DNA NPH QL NAA+PROBE: NOT DETECTED
HAV IGM SER QL: NEGATIVE
HBOV DNA SPEC QL NAA+PROBE: NOT DETECTED
HBV CORE IGM SER QL: NEGATIVE
HBV SURFACE AG SER QL: NEGATIVE
HCOV 229E RNA SPEC QL NAA+PROBE: NOT DETECTED
HCOV HKU1 RNA SPEC QL NAA+PROBE: NOT DETECTED
HCOV NL63 RNA SPEC QL NAA+PROBE: NOT DETECTED
HCOV OC43 RNA SPEC QL NAA+PROBE: NOT DETECTED
HCT VFR BLD CALC: 36.3 % (ref 39–51)
HCV AB SER QL: NEGATIVE
HGB BLD-MCNC: 11.4 G/DL (ref 13–17)
HMPV RNA NPH QL NAA+PROBE: NOT DETECTED
HPIV1 RNA NPH QL NAA+PROBE: NOT DETECTED
HPIV2 RNA NPH QL NAA+PROBE: NOT DETECTED
HPIV3 RNA NPH QL NAA+PROBE: NOT DETECTED
HPIV4 RNA NPH QL NAA+PROBE: NOT DETECTED
LYMPHOCYTES # BLD MANUAL: 0.7 K/MCL (ref 1–4)
LYMPHOCYTES NFR BLD MANUAL: 15 %
M PNEUMO DNA SPEC QL NAA+PROBE: NOT DETECTED
MCH RBC QN AUTO: 27.9 PG (ref 26–34)
MCHC RBC AUTO-ENTMCNC: 31.4 G/DL (ref 32–36.5)
MCV RBC AUTO: 89 FL (ref 78–100)
MONOCYTES # BLD MANUAL: 0.5 K/MCL (ref 0.3–0.9)
MONOCYTES NFR BLD MANUAL: 12 %
NEUTROPHILS # BLD: 3.2 K/MCL (ref 1.8–7.7)
NEUTROPHILS NFR BLD AUTO: 73 %
NRBC BLD MANUAL-RTO: 0 /100 WBC
P AXIS (DEGREES): 52
PLATELET # BLD: 102 K/MCL (ref 140–450)
POTASSIUM SERPL-SCNC: 3.5 MMOL/L (ref 3.4–5.1)
PR-INTERVAL (MSEC): 196
PROT SERPL-MCNC: 5.3 G/DL (ref 6.4–8.2)
QRS-INTERVAL (MSEC): 142
QT-INTERVAL (MSEC): 408
QTC: 462
R AXIS (DEGREES): -40
RBC # BLD: 4.08 MIL/MCL (ref 4.5–5.9)
REPORT TEXT: NORMAL
RSV A RNA NPH QL NAA+PROBE: NOT DETECTED
RSV B RNA NPH QL NAA+PROBE: NOT DETECTED
RV+EV RNA SPEC QL NAA+PROBE: NOT DETECTED
SODIUM SERPL-SCNC: 139 MMOL/L (ref 135–145)
SPECIMEN SOURCE: ABNORMAL
T AXIS (DEGREES): 19
VENTRICULAR RATE EKG/MIN (BPM): 77
WBC # BLD: 4.3 K/MCL (ref 4.2–11)

## 2020-02-24 PROCEDURE — 10002807 HB RX 258: Performed by: HOSPITALIST

## 2020-02-24 PROCEDURE — 10002803 HB RX 637: Performed by: HOSPITALIST

## 2020-02-24 PROCEDURE — 36415 COLL VENOUS BLD VENIPUNCTURE: CPT

## 2020-02-24 PROCEDURE — 92526 ORAL FUNCTION THERAPY: CPT

## 2020-02-24 PROCEDURE — 10003585 HB ROOM CHARGE INTERMEDIATE CARE

## 2020-02-24 PROCEDURE — 10002800 HB RX 250 W HCPCS: Performed by: HOSPITALIST

## 2020-02-24 PROCEDURE — 10002805 HB CONTRAST AGENT: Performed by: HOSPITALIST

## 2020-02-24 PROCEDURE — 97166 OT EVAL MOD COMPLEX 45 MIN: CPT

## 2020-02-24 PROCEDURE — 85025 COMPLETE CBC W/AUTO DIFF WBC: CPT

## 2020-02-24 PROCEDURE — 94660 CPAP INITIATION&MGMT: CPT

## 2020-02-24 PROCEDURE — 10004651 HB RX, NO CHARGE ITEM: Performed by: EMERGENCY MEDICINE

## 2020-02-24 PROCEDURE — 80053 COMPREHEN METABOLIC PANEL: CPT

## 2020-02-24 PROCEDURE — 80074 ACUTE HEPATITIS PANEL: CPT

## 2020-02-24 PROCEDURE — 71275 CT ANGIOGRAPHY CHEST: CPT

## 2020-02-24 RX ORDER — MAGNESIUM SULFATE 1 G/100ML
1 INJECTION INTRAVENOUS DAILY PRN
Status: DISCONTINUED | OUTPATIENT
Start: 2020-02-24 | End: 2020-02-26 | Stop reason: HOSPADM

## 2020-02-24 RX ORDER — FUROSEMIDE 10 MG/ML
20 INJECTION INTRAMUSCULAR; INTRAVENOUS ONCE
Status: COMPLETED | OUTPATIENT
Start: 2020-02-24 | End: 2020-02-25

## 2020-02-24 RX ORDER — POTASSIUM CHLORIDE 20 MEQ/1
20 TABLET, EXTENDED RELEASE ORAL EVERY 4 HOURS PRN
Status: DISCONTINUED | OUTPATIENT
Start: 2020-02-24 | End: 2020-02-26 | Stop reason: HOSPADM

## 2020-02-24 RX ORDER — POTASSIUM CHLORIDE 14.9 MG/ML
20 INJECTION INTRAVENOUS EVERY 4 HOURS PRN
Status: DISCONTINUED | OUTPATIENT
Start: 2020-02-24 | End: 2020-02-26 | Stop reason: HOSPADM

## 2020-02-24 RX ORDER — POTASSIUM CHLORIDE 1.5 G/1.58G
40 POWDER, FOR SOLUTION ORAL EVERY 4 HOURS PRN
Status: DISCONTINUED | OUTPATIENT
Start: 2020-02-24 | End: 2020-02-26 | Stop reason: HOSPADM

## 2020-02-24 RX ORDER — POTASSIUM CHLORIDE 1.5 G/1.58G
20 POWDER, FOR SOLUTION ORAL EVERY 4 HOURS PRN
Status: DISCONTINUED | OUTPATIENT
Start: 2020-02-24 | End: 2020-02-26 | Stop reason: HOSPADM

## 2020-02-24 RX ORDER — LACTULOSE 10 G/15ML
30 SOLUTION ORAL ONCE
Status: COMPLETED | OUTPATIENT
Start: 2020-02-24 | End: 2020-02-24

## 2020-02-24 RX ORDER — POTASSIUM CHLORIDE 20 MEQ/1
40 TABLET, EXTENDED RELEASE ORAL EVERY 4 HOURS PRN
Status: DISCONTINUED | OUTPATIENT
Start: 2020-02-24 | End: 2020-02-26 | Stop reason: HOSPADM

## 2020-02-24 RX ADMIN — OSELTAMIVIR PHOSPHATE 75 MG: 75 CAPSULE ORAL at 12:28

## 2020-02-24 RX ADMIN — SODIUM CHLORIDE 1000 ML: 0.9 INJECTION, SOLUTION INTRAVENOUS at 15:42

## 2020-02-24 RX ADMIN — POTASSIUM CHLORIDE 20 MEQ: 1500 TABLET, EXTENDED RELEASE ORAL at 15:29

## 2020-02-24 RX ADMIN — PIPERACILLIN AND TAZOBACTAM 3.38 G: 3; .375 INJECTION, POWDER, LYOPHILIZED, FOR SOLUTION INTRAVENOUS at 05:37

## 2020-02-24 RX ADMIN — SODIUM CHLORIDE: 0.9 INJECTION, SOLUTION INTRAVENOUS at 05:38

## 2020-02-24 RX ADMIN — ENOXAPARIN SODIUM 40 MG: 40 INJECTION SUBCUTANEOUS at 08:29

## 2020-02-24 RX ADMIN — PIPERACILLIN AND TAZOBACTAM 3.38 G: 3; .375 INJECTION, POWDER, LYOPHILIZED, FOR SOLUTION INTRAVENOUS at 22:31

## 2020-02-24 RX ADMIN — SODIUM CHLORIDE, PRESERVATIVE FREE 2 ML: 5 INJECTION INTRAVENOUS at 08:29

## 2020-02-24 RX ADMIN — POLYETHYLENE GLYCOL 3350 17 G: 17 POWDER, FOR SOLUTION ORAL at 12:28

## 2020-02-24 RX ADMIN — OSELTAMIVIR PHOSPHATE 75 MG: 75 CAPSULE ORAL at 22:25

## 2020-02-24 RX ADMIN — IOHEXOL 80 ML: 350 INJECTION, SOLUTION INTRAVENOUS at 21:00

## 2020-02-24 RX ADMIN — LACTULOSE 30 G: 10 SOLUTION ORAL at 15:29

## 2020-02-24 RX ADMIN — SODIUM CHLORIDE, PRESERVATIVE FREE 2 ML: 5 INJECTION INTRAVENOUS at 22:33

## 2020-02-24 RX ADMIN — PIPERACILLIN AND TAZOBACTAM 3.38 G: 3; .375 INJECTION, POWDER, LYOPHILIZED, FOR SOLUTION INTRAVENOUS at 15:37

## 2020-02-24 ASSESSMENT — COGNITIVE AND FUNCTIONAL STATUS - GENERAL
HELP NEEDED FOR BATHING: A LOT
HELP NEEDED FOR PERSONAL GROOMING: A LOT
HELP NEEDED DRESSING REGULAR UPPER BODY CLOTHING: A LOT
DAILY_ACTIVITY_RAW_SCORE: 13
HELP NEEDED FOR TOILETING: A LOT
HELP NEEDED DRESSING REGULAR LOWER BODY CLOTHING: A LOT
DAILY_ACTIVITY_CONVERTED_SCORE: 32.03

## 2020-02-24 ASSESSMENT — ACTIVITIES OF DAILY LIVING (ADL): PRIOR_ADL: MINIMAL ASSIST (MIN)

## 2020-02-24 ASSESSMENT — PAIN SCALES - GENERAL
PAINLEVEL_OUTOF10: 0
PAINLEVEL_OUTOF10: 0

## 2020-02-25 ENCOUNTER — APPOINTMENT (OUTPATIENT)
Dept: GENERAL RADIOLOGY | Age: 75
DRG: 871 | End: 2020-02-25
Attending: HOSPITALIST

## 2020-02-25 ENCOUNTER — CASE MANAGEMENT (OUTPATIENT)
Dept: CARE COORDINATION | Age: 75
End: 2020-02-25

## 2020-02-25 LAB
ALBUMIN SERPL-MCNC: 2.2 G/DL (ref 3.6–5.1)
ALBUMIN SERPL-MCNC: 2.3 G/DL (ref 3.6–5.1)
ALBUMIN/GLOB SERPL: 0.6 {RATIO} (ref 1–2.4)
ALBUMIN/GLOB SERPL: 0.6 {RATIO} (ref 1–2.4)
ALP SERPL-CCNC: 91 UNITS/L (ref 45–117)
ALP SERPL-CCNC: 97 UNITS/L (ref 45–117)
ALT SERPL-CCNC: 152 UNITS/L
ALT SERPL-CCNC: 152 UNITS/L
ANION GAP SERPL CALC-SCNC: 9 MMOL/L (ref 10–20)
ANION GAP SERPL CALC-SCNC: 9 MMOL/L (ref 10–20)
AST SERPL-CCNC: 530 UNITS/L
AST SERPL-CCNC: 594 UNITS/L
BASOPHILS # BLD AUTO: 0 K/MCL (ref 0–0.3)
BASOPHILS NFR BLD AUTO: 1 %
BILIRUB SERPL-MCNC: 0.5 MG/DL (ref 0.2–1)
BILIRUB SERPL-MCNC: 0.5 MG/DL (ref 0.2–1)
BUN SERPL-MCNC: 12 MG/DL (ref 6–20)
BUN SERPL-MCNC: 14 MG/DL (ref 6–20)
BUN/CREAT SERPL: 15 (ref 7–25)
BUN/CREAT SERPL: 18 (ref 7–25)
CALCIUM SERPL-MCNC: 7.9 MG/DL (ref 8.4–10.2)
CALCIUM SERPL-MCNC: 7.9 MG/DL (ref 8.4–10.2)
CHLORIDE SERPL-SCNC: 105 MMOL/L (ref 98–107)
CHLORIDE SERPL-SCNC: 107 MMOL/L (ref 98–107)
CO2 SERPL-SCNC: 28 MMOL/L (ref 21–32)
CO2 SERPL-SCNC: 28 MMOL/L (ref 21–32)
CREAT SERPL-MCNC: 0.79 MG/DL (ref 0.67–1.17)
CREAT SERPL-MCNC: 0.8 MG/DL (ref 0.67–1.17)
DIFFERENTIAL METHOD BLD: ABNORMAL
EOSINOPHIL # BLD AUTO: 0 K/MCL (ref 0.1–0.5)
EOSINOPHIL NFR SPEC: 0 %
ERYTHROCYTE [DISTWIDTH] IN BLOOD: 13.7 % (ref 11–15)
GLOBULIN SER-MCNC: 3.5 G/DL (ref 2–4)
GLOBULIN SER-MCNC: 3.7 G/DL (ref 2–4)
GLUCOSE SERPL-MCNC: 118 MG/DL (ref 65–99)
GLUCOSE SERPL-MCNC: 88 MG/DL (ref 65–99)
HCT VFR BLD CALC: 37.6 % (ref 39–51)
HGB BLD-MCNC: 12 G/DL (ref 13–17)
IMM GRANULOCYTES # BLD AUTO: 0 K/MCL (ref 0–0.2)
IMM GRANULOCYTES NFR BLD: 0 %
LYMPHOCYTES # BLD MANUAL: 0.6 K/MCL (ref 1–4)
LYMPHOCYTES NFR BLD MANUAL: 17 %
MCH RBC QN AUTO: 27.6 PG (ref 26–34)
MCHC RBC AUTO-ENTMCNC: 31.9 G/DL (ref 32–36.5)
MCV RBC AUTO: 86.6 FL (ref 78–100)
MONOCYTES # BLD MANUAL: 0.5 K/MCL (ref 0.3–0.9)
MONOCYTES NFR BLD MANUAL: 14 %
NEUTROPHILS # BLD: 2.4 K/MCL (ref 1.8–7.7)
NEUTROPHILS NFR BLD AUTO: 68 %
NRBC BLD MANUAL-RTO: 0 /100 WBC
PLATELET # BLD: 110 K/MCL (ref 140–450)
POTASSIUM SERPL-SCNC: 3.2 MMOL/L (ref 3.4–5.1)
POTASSIUM SERPL-SCNC: 3.3 MMOL/L (ref 3.4–5.1)
POTASSIUM SERPL-SCNC: 3.3 MMOL/L (ref 3.4–5.1)
PROT SERPL-MCNC: 5.7 G/DL (ref 6.4–8.2)
PROT SERPL-MCNC: 6 G/DL (ref 6.4–8.2)
RBC # BLD: 4.34 MIL/MCL (ref 4.5–5.9)
SODIUM SERPL-SCNC: 139 MMOL/L (ref 135–145)
SODIUM SERPL-SCNC: 141 MMOL/L (ref 135–145)
WBC # BLD: 3.5 K/MCL (ref 4.2–11)

## 2020-02-25 PROCEDURE — 80053 COMPREHEN METABOLIC PANEL: CPT

## 2020-02-25 PROCEDURE — 10002803 HB RX 637: Performed by: HOSPITALIST

## 2020-02-25 PROCEDURE — 10002800 HB RX 250 W HCPCS: Performed by: HOSPITALIST

## 2020-02-25 PROCEDURE — 94660 CPAP INITIATION&MGMT: CPT

## 2020-02-25 PROCEDURE — 97162 PT EVAL MOD COMPLEX 30 MIN: CPT

## 2020-02-25 PROCEDURE — 10003585 HB ROOM CHARGE INTERMEDIATE CARE

## 2020-02-25 PROCEDURE — 84132 ASSAY OF SERUM POTASSIUM: CPT

## 2020-02-25 PROCEDURE — 74230 X-RAY XM SWLNG FUNCJ C+: CPT

## 2020-02-25 PROCEDURE — 85025 COMPLETE CBC W/AUTO DIFF WBC: CPT

## 2020-02-25 PROCEDURE — 36415 COLL VENOUS BLD VENIPUNCTURE: CPT

## 2020-02-25 PROCEDURE — 10002807 HB RX 258: Performed by: HOSPITALIST

## 2020-02-25 PROCEDURE — 92611 MOTION FLUOROSCOPY/SWALLOW: CPT

## 2020-02-25 PROCEDURE — 10004651 HB RX, NO CHARGE ITEM: Performed by: EMERGENCY MEDICINE

## 2020-02-25 RX ORDER — POTASSIUM CHLORIDE 1.5 G/1.58G
20 POWDER, FOR SOLUTION ORAL EVERY 4 HOURS PRN
Status: DISCONTINUED | OUTPATIENT
Start: 2020-02-25 | End: 2020-02-25

## 2020-02-25 RX ORDER — POTASSIUM CHLORIDE 20 MEQ/1
40 TABLET, EXTENDED RELEASE ORAL EVERY 4 HOURS PRN
Status: DISCONTINUED | OUTPATIENT
Start: 2020-02-25 | End: 2020-02-25

## 2020-02-25 RX ORDER — MAGNESIUM SULFATE 1 G/100ML
1 INJECTION INTRAVENOUS DAILY PRN
Status: DISCONTINUED | OUTPATIENT
Start: 2020-02-25 | End: 2020-02-25

## 2020-02-25 RX ORDER — POTASSIUM CHLORIDE 20 MEQ/1
20 TABLET, EXTENDED RELEASE ORAL EVERY 4 HOURS PRN
Status: DISCONTINUED | OUTPATIENT
Start: 2020-02-25 | End: 2020-02-25

## 2020-02-25 RX ORDER — POTASSIUM CHLORIDE 14.9 MG/ML
20 INJECTION INTRAVENOUS EVERY 4 HOURS PRN
Status: DISCONTINUED | OUTPATIENT
Start: 2020-02-25 | End: 2020-02-25

## 2020-02-25 RX ORDER — POTASSIUM CHLORIDE 1.5 G/1.58G
40 POWDER, FOR SOLUTION ORAL EVERY 4 HOURS PRN
Status: DISCONTINUED | OUTPATIENT
Start: 2020-02-25 | End: 2020-02-25

## 2020-02-25 RX ADMIN — POTASSIUM CHLORIDE 40 MEQ: 1500 TABLET, EXTENDED RELEASE ORAL at 17:45

## 2020-02-25 RX ADMIN — PIPERACILLIN AND TAZOBACTAM 3.38 G: 3; .375 INJECTION, POWDER, LYOPHILIZED, FOR SOLUTION INTRAVENOUS at 06:31

## 2020-02-25 RX ADMIN — OSELTAMIVIR PHOSPHATE 75 MG: 75 CAPSULE ORAL at 10:57

## 2020-02-25 RX ADMIN — OSELTAMIVIR PHOSPHATE 75 MG: 75 CAPSULE ORAL at 21:21

## 2020-02-25 RX ADMIN — ENOXAPARIN SODIUM 40 MG: 40 INJECTION SUBCUTANEOUS at 10:56

## 2020-02-25 RX ADMIN — SODIUM CHLORIDE, PRESERVATIVE FREE 2 ML: 5 INJECTION INTRAVENOUS at 10:58

## 2020-02-25 RX ADMIN — POTASSIUM CHLORIDE 40 MEQ: 1.5 POWDER, FOR SOLUTION ORAL at 10:58

## 2020-02-25 RX ADMIN — PIPERACILLIN AND TAZOBACTAM 3.38 G: 3; .375 INJECTION, POWDER, LYOPHILIZED, FOR SOLUTION INTRAVENOUS at 21:37

## 2020-02-25 RX ADMIN — PIPERACILLIN AND TAZOBACTAM 3.38 G: 3; .375 INJECTION, POWDER, LYOPHILIZED, FOR SOLUTION INTRAVENOUS at 15:28

## 2020-02-25 RX ADMIN — SODIUM CHLORIDE, PRESERVATIVE FREE 2 ML: 5 INJECTION INTRAVENOUS at 21:21

## 2020-02-25 RX ADMIN — POLYETHYLENE GLYCOL 3350 17 G: 17 POWDER, FOR SOLUTION ORAL at 10:57

## 2020-02-25 RX ADMIN — FUROSEMIDE 20 MG: 10 INJECTION, SOLUTION INTRAMUSCULAR; INTRAVENOUS at 00:01

## 2020-02-25 ASSESSMENT — COGNITIVE AND FUNCTIONAL STATUS - GENERAL
BASIC_MOBILITY_CONVERTED_SCORE: 38.32
BASIC_MOBILITY_RAW_SCORE: 16

## 2020-02-25 ASSESSMENT — PAIN SCALES - GENERAL
PAINLEVEL_OUTOF10: 0
PAINLEVEL_OUTOF10: 0

## 2020-02-26 ENCOUNTER — TRANSCRIBE ORDERS (OUTPATIENT)
Dept: CARDIOLOGY | Age: 75
End: 2020-02-26

## 2020-02-26 VITALS
RESPIRATION RATE: 12 BRPM | OXYGEN SATURATION: 95 % | WEIGHT: 167.11 LBS | DIASTOLIC BLOOD PRESSURE: 74 MMHG | SYSTOLIC BLOOD PRESSURE: 131 MMHG | TEMPERATURE: 98.8 F | HEIGHT: 72 IN | HEART RATE: 66 BPM | BODY MASS INDEX: 22.63 KG/M2

## 2020-02-26 LAB
ALBUMIN SERPL-MCNC: 2.1 G/DL (ref 3.6–5.1)
ALBUMIN/GLOB SERPL: 0.6 {RATIO} (ref 1–2.4)
ALP SERPL-CCNC: 92 UNITS/L (ref 45–117)
ALT SERPL-CCNC: 130 UNITS/L
ANION GAP SERPL CALC-SCNC: 8 MMOL/L (ref 10–20)
AST SERPL-CCNC: 406 UNITS/L
BASOPHILS # BLD AUTO: 0 K/MCL (ref 0–0.3)
BASOPHILS NFR BLD AUTO: 0 %
BILIRUB SERPL-MCNC: 0.5 MG/DL (ref 0.2–1)
BUN SERPL-MCNC: 12 MG/DL (ref 6–20)
BUN/CREAT SERPL: 15 (ref 7–25)
CALCIUM SERPL-MCNC: 7.9 MG/DL (ref 8.4–10.2)
CHLORIDE SERPL-SCNC: 105 MMOL/L (ref 98–107)
CO2 SERPL-SCNC: 30 MMOL/L (ref 21–32)
CREAT SERPL-MCNC: 0.78 MG/DL (ref 0.67–1.17)
DIFFERENTIAL METHOD BLD: ABNORMAL
EOSINOPHIL # BLD AUTO: 0 K/MCL (ref 0.1–0.5)
EOSINOPHIL NFR SPEC: 1 %
ERYTHROCYTE [DISTWIDTH] IN BLOOD: 13.8 % (ref 11–15)
GLOBULIN SER-MCNC: 3.6 G/DL (ref 2–4)
GLUCOSE SERPL-MCNC: 92 MG/DL (ref 65–99)
HCT VFR BLD CALC: 37.9 % (ref 39–51)
HGB BLD-MCNC: 12.1 G/DL (ref 13–17)
IMM GRANULOCYTES # BLD AUTO: 0 K/MCL (ref 0–0.2)
IMM GRANULOCYTES NFR BLD: 0 %
LYMPHOCYTES # BLD MANUAL: 0.8 K/MCL (ref 1–4)
LYMPHOCYTES NFR BLD MANUAL: 24 %
MCH RBC QN AUTO: 27.9 PG (ref 26–34)
MCHC RBC AUTO-ENTMCNC: 31.9 G/DL (ref 32–36.5)
MCV RBC AUTO: 87.5 FL (ref 78–100)
MONOCYTES # BLD MANUAL: 0.5 K/MCL (ref 0.3–0.9)
MONOCYTES NFR BLD MANUAL: 15 %
NEUTROPHILS # BLD: 1.9 K/MCL (ref 1.8–7.7)
NEUTROPHILS NFR BLD AUTO: 60 %
NRBC BLD MANUAL-RTO: 0 /100 WBC
PLATELET # BLD: 104 K/MCL (ref 140–450)
POTASSIUM SERPL-SCNC: 3.8 MMOL/L (ref 3.4–5.1)
POTASSIUM SERPL-SCNC: 4.2 MMOL/L (ref 3.4–5.1)
PROT SERPL-MCNC: 5.7 G/DL (ref 6.4–8.2)
RBC # BLD: 4.33 MIL/MCL (ref 4.5–5.9)
SODIUM SERPL-SCNC: 139 MMOL/L (ref 135–145)
WBC # BLD: 3.2 K/MCL (ref 4.2–11)

## 2020-02-26 PROCEDURE — 94660 CPAP INITIATION&MGMT: CPT

## 2020-02-26 PROCEDURE — 97530 THERAPEUTIC ACTIVITIES: CPT

## 2020-02-26 PROCEDURE — 10002803 HB RX 637: Performed by: HOSPITALIST

## 2020-02-26 PROCEDURE — 10004651 HB RX, NO CHARGE ITEM: Performed by: EMERGENCY MEDICINE

## 2020-02-26 PROCEDURE — 80053 COMPREHEN METABOLIC PANEL: CPT

## 2020-02-26 PROCEDURE — 10002807 HB RX 258: Performed by: HOSPITALIST

## 2020-02-26 PROCEDURE — 10002800 HB RX 250 W HCPCS: Performed by: HOSPITALIST

## 2020-02-26 PROCEDURE — 85025 COMPLETE CBC W/AUTO DIFF WBC: CPT

## 2020-02-26 PROCEDURE — 36415 COLL VENOUS BLD VENIPUNCTURE: CPT

## 2020-02-26 RX ORDER — POLYETHYLENE GLYCOL 3350 17 G/17G
17 POWDER, FOR SOLUTION ORAL DAILY PRN
Qty: 255 G | Refills: 0 | Status: SHIPPED
Start: 2020-02-26

## 2020-02-26 RX ORDER — AMOXICILLIN AND CLAVULANATE POTASSIUM 875; 125 MG/1; MG/1
1 TABLET, FILM COATED ORAL 2 TIMES DAILY
Qty: 20 TABLET | Refills: 0 | Status: SHIPPED | OUTPATIENT
Start: 2020-02-26 | End: 2020-03-07

## 2020-02-26 RX ORDER — ONDANSETRON 4 MG/1
4 TABLET, ORALLY DISINTEGRATING ORAL EVERY 8 HOURS PRN
Qty: 12 TABLET | Refills: 0 | Status: SHIPPED
Start: 2020-02-26 | End: 2020-03-09

## 2020-02-26 RX ORDER — AMOXICILLIN 250 MG
1 CAPSULE ORAL 2 TIMES DAILY
Qty: 60 TABLET | Refills: 0 | Status: SHIPPED | OUTPATIENT
Start: 2020-02-26

## 2020-02-26 RX ORDER — OSELTAMIVIR PHOSPHATE 75 MG/1
75 CAPSULE ORAL EVERY 12 HOURS SCHEDULED
Qty: 6 CAPSULE | Refills: 0 | Status: SHIPPED | OUTPATIENT
Start: 2020-02-26

## 2020-02-26 RX ADMIN — ENOXAPARIN SODIUM 40 MG: 40 INJECTION SUBCUTANEOUS at 08:21

## 2020-02-26 RX ADMIN — POTASSIUM CHLORIDE 20 MEQ: 1500 TABLET, EXTENDED RELEASE ORAL at 08:21

## 2020-02-26 RX ADMIN — SODIUM CHLORIDE, PRESERVATIVE FREE 2 ML: 5 INJECTION INTRAVENOUS at 08:19

## 2020-02-26 RX ADMIN — PIPERACILLIN AND TAZOBACTAM 3.38 G: 3; .375 INJECTION, POWDER, LYOPHILIZED, FOR SOLUTION INTRAVENOUS at 06:08

## 2020-02-26 RX ADMIN — POLYETHYLENE GLYCOL 3350 17 G: 17 POWDER, FOR SOLUTION ORAL at 08:19

## 2020-02-26 RX ADMIN — OSELTAMIVIR PHOSPHATE 75 MG: 75 CAPSULE ORAL at 08:21

## 2020-02-26 ASSESSMENT — COGNITIVE AND FUNCTIONAL STATUS - GENERAL
HELP NEEDED FOR PERSONAL GROOMING: A LITTLE
HELP NEEDED FOR BATHING: A LOT
BASIC_MOBILITY_CONVERTED_SCORE: 39.67
HELP NEEDED DRESSING REGULAR LOWER BODY CLOTHING: A LOT
BASIC_MOBILITY_RAW_SCORE: 17
DAILY_ACTIVITY_CONVERTED_SCORE: 34.69
HELP NEEDED DRESSING REGULAR UPPER BODY CLOTHING: A LITTLE
DAILY_ACTIVITY_RAW_SCORE: 15
HELP NEEDED FOR TOILETING: A LOT

## 2020-02-26 ASSESSMENT — PAIN SCALES - GENERAL: PAINLEVEL_OUTOF10: 0

## 2020-02-27 LAB
BACTERIA BLD CULT: NORMAL
BACTERIA BLD CULT: NORMAL
REPORT STATUS (RPT): NORMAL
REPORT STATUS (RPT): NORMAL
SPECIMEN SOURCE: NORMAL
SPECIMEN SOURCE: NORMAL

## 2020-03-14 ENCOUNTER — CASE MANAGEMENT (OUTPATIENT)
Dept: CARE COORDINATION | Age: 75
End: 2020-03-14

## 2020-03-20 ENCOUNTER — TRANSCRIBE ORDERS (OUTPATIENT)
Dept: POST ACUTE CARE | Age: 75
End: 2020-03-20

## 2020-03-20 DIAGNOSIS — Z86.19 HISTORY OF SEPSIS: ICD-10-CM

## 2020-03-20 DIAGNOSIS — J10.00 PNEUMONIA DUE TO INFLUENZA A VIRUS, UNSPECIFIED LATERALITY, UNSPECIFIED PART OF LUNG: Primary | ICD-10-CM

## 2020-03-20 DIAGNOSIS — F03.90 DEMENTIA WITHOUT BEHAVIORAL DISTURBANCE, UNSPECIFIED DEMENTIA TYPE: ICD-10-CM

## 2020-03-20 DIAGNOSIS — R53.1 WEAKNESS: ICD-10-CM

## 2020-03-23 ENCOUNTER — CASE MANAGEMENT (OUTPATIENT)
Dept: CARE COORDINATION | Age: 75
End: 2020-03-23

## 2020-10-16 ENCOUNTER — APPOINTMENT (OUTPATIENT)
Dept: CT IMAGING | Facility: HOSPITAL | Age: 75
End: 2020-10-16
Attending: EMERGENCY MEDICINE
Payer: MEDICARE

## 2020-10-16 ENCOUNTER — HOSPITAL ENCOUNTER (EMERGENCY)
Facility: HOSPITAL | Age: 75
Discharge: HOME | End: 2020-10-16
Attending: EMERGENCY MEDICINE
Payer: MEDICARE

## 2020-10-16 VITALS
SYSTOLIC BLOOD PRESSURE: 128 MMHG | DIASTOLIC BLOOD PRESSURE: 87 MMHG | HEART RATE: 68 BPM | RESPIRATION RATE: 18 BRPM | TEMPERATURE: 98 F | OXYGEN SATURATION: 98 %

## 2020-10-16 DIAGNOSIS — S01.81XA LACERATION OF FOREHEAD, INITIAL ENCOUNTER: Primary | ICD-10-CM

## 2020-10-16 PROCEDURE — 12013 RPR F/E/E/N/L/M 2.6-5.0 CM: CPT

## 2020-10-16 PROCEDURE — 99284 EMERGENCY DEPT VISIT MOD MDM: CPT

## 2020-10-16 PROCEDURE — 70450 CT HEAD/BRAIN W/O DYE: CPT | Performed by: EMERGENCY MEDICINE

## 2020-10-16 NOTE — ED INITIAL ASSESSMENT (HPI)
Via EMS from [facility] care unit. Patient hit head on refrigerator, witnessed.  + lac to forehead, bleeding controlled  No anticoagulants on med list

## 2020-10-16 NOTE — ED NOTES
Spoke with RN at [facility]. States the swelling in his legs is an ongoing issue and is currently being treated. States concerned because pt was \"banging his head on the refrigerator pretty hard\" .

## 2020-10-16 NOTE — ED NOTES
Pt states he was opening the door to the refrigerator and hit his head. States he also has other problems that he needs fixed. States he legs are swollen. States it has been for a long while. Pt has laceration to forehead and edema to bilateral legs.

## 2020-10-16 NOTE — ED PROVIDER NOTES
Patient Seen in: [facility] AND [facility] Emergency Department    History   Patient presents with:  Laceration/Abrasion    Stated Complaint:     HPI    Patient presents from memory care unit for forehead laceration.   He was opening a refrigerator door and as he Device None (Room air)       Current:BP (!) 131/91   Pulse 66   Temp 97.3 °F (36.3 °C) (Oral)   Resp 18   SpO2 97%         Physical Exam  Constitutional:  Alert, well nourished adult lying in bed in no distress. Vital signs noted.   HEENT: There is a linea

## 2020-10-16 NOTE — CM/SW NOTE
[facility] ambulance arranged. ETA 30-45 mins (approx 1320 - 1335)    PCS form completed in Epic, printed and given to RN. Face sheet printed.

## 2020-11-07 ENCOUNTER — APPOINTMENT (OUTPATIENT)
Dept: CT IMAGING | Facility: HOSPITAL | Age: 75
End: 2020-11-07
Attending: EMERGENCY MEDICINE
Payer: MEDICARE

## 2020-11-07 ENCOUNTER — HOSPITAL ENCOUNTER (EMERGENCY)
Facility: HOSPITAL | Age: 75
Discharge: HOME | End: 2020-11-07
Attending: EMERGENCY MEDICINE
Payer: MEDICARE

## 2020-11-07 VITALS
BODY MASS INDEX: 37 KG/M2 | HEART RATE: 75 BPM | SYSTOLIC BLOOD PRESSURE: 136 MMHG | TEMPERATURE: 99 F | DIASTOLIC BLOOD PRESSURE: 93 MMHG | OXYGEN SATURATION: 94 % | WEIGHT: 253.5 LBS | RESPIRATION RATE: 20 BRPM

## 2020-11-07 DIAGNOSIS — S00.93XA CONTUSION OF HEAD, UNSPECIFIED PART OF HEAD, INITIAL ENCOUNTER: ICD-10-CM

## 2020-11-07 DIAGNOSIS — S16.1XXA STRAIN OF NECK MUSCLE, INITIAL ENCOUNTER: ICD-10-CM

## 2020-11-07 DIAGNOSIS — S01.01XA LACERATION OF SCALP, INITIAL ENCOUNTER: Primary | ICD-10-CM

## 2020-11-07 PROCEDURE — 99284 EMERGENCY DEPT VISIT MOD MDM: CPT

## 2020-11-07 PROCEDURE — 12004 RPR S/N/AX/GEN/TRK7.6-12.5CM: CPT

## 2020-11-07 PROCEDURE — 70450 CT HEAD/BRAIN W/O DYE: CPT | Performed by: EMERGENCY MEDICINE

## 2020-11-07 PROCEDURE — 72125 CT NECK SPINE W/O DYE: CPT | Performed by: EMERGENCY MEDICINE

## 2020-11-07 RX ORDER — MEMANTINE HYDROCHLORIDE 5 MG/1
10 TABLET ORAL 2 TIMES DAILY
COMMUNITY

## 2020-11-07 RX ORDER — PANTOPRAZOLE SODIUM 40 MG/1
40 TABLET, DELAYED RELEASE ORAL
COMMUNITY

## 2020-11-07 RX ORDER — FUROSEMIDE 20 MG/1
20 TABLET ORAL 2 TIMES DAILY
COMMUNITY

## 2020-11-07 NOTE — ED INITIAL ASSESSMENT (HPI)
Arrived via ems from [facility] for unwitnessed fall. Unknown LOC. A&Ox1 per baseline. Laceration to forehead and right hand. No blood thinner. C-collar in place by EMS staff.

## 2020-11-07 NOTE — ED NOTES
[facility] called for patient transport back tp [address] [facility] ETA 60 minutes.

## 2020-11-07 NOTE — ED NOTES
Writer attempted to call RN at [facility] reports RN will call writer back. ER phone number provided.

## 2020-11-07 NOTE — ED PROVIDER NOTES
Patient Seen in: [facility] AND [facility] Emergency Department    History   Patient presents with:  Fall    Stated Complaint:     HPI    Patient here with complaint of head injury. Injury occurred pta patient has dementia unable to provide hx. EMS noted glf. vital signs reviewed. All other systems reviewed and negative except as noted above. PSFH elements reviewed from today and agreed except as otherwise stated in HPI.     Physical Exam     ED Triage Vitals [11/07/20 0538]   BP (!) 133/96   Pulse 76 Dictated by (CST): Marianne Avendano MD on 11/07/2020 at 6:39 AM     Finalized by (CST): Marianne Avendano MD on 11/07/2020 at 6:44 AM          Ct Spine Cervical (cpt=72125)    Result Date: 11/7/2020  CONCLUSION:  1.  No acute fracture or posttraumatic malal

## 2021-07-15 ENCOUNTER — TELEPHONE (OUTPATIENT)
Dept: OTHER | Age: 76
End: 2021-07-15

## 2021-10-07 ENCOUNTER — APPOINTMENT (OUTPATIENT)
Dept: CT IMAGING | Facility: HOSPITAL | Age: 76
End: 2021-10-07
Attending: EMERGENCY MEDICINE
Payer: MEDICARE

## 2021-10-07 ENCOUNTER — HOSPITAL ENCOUNTER (EMERGENCY)
Facility: HOSPITAL | Age: 76
Discharge: HOME | End: 2021-10-08
Attending: EMERGENCY MEDICINE
Payer: MEDICARE

## 2021-10-07 DIAGNOSIS — S00.81XA ABRASION OF FACE, INITIAL ENCOUNTER: ICD-10-CM

## 2021-10-07 DIAGNOSIS — S09.90XA CLOSED HEAD INJURY, INITIAL ENCOUNTER: ICD-10-CM
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
## 2021-10-07 PROCEDURE — 72125 CT NECK SPINE W/O DYE: CPT | Performed by: EMERGENCY MEDICINE

## 2021-10-07 PROCEDURE — 70450 CT HEAD/BRAIN W/O DYE: CPT | Performed by: EMERGENCY MEDICINE

## 2021-10-07 PROCEDURE — 96372 THER/PROPH/DIAG INJ SC/IM: CPT

## 2021-10-07 PROCEDURE — 99284 EMERGENCY DEPT VISIT MOD MDM: CPT

## 2021-10-08 VITALS
HEART RATE: 93 BPM | BODY MASS INDEX: 29.62 KG/M2 | WEIGHT: 200 LBS | HEIGHT: 69 IN | OXYGEN SATURATION: 92 % | TEMPERATURE: 99 F | SYSTOLIC BLOOD PRESSURE: 121 MMHG | DIASTOLIC BLOOD PRESSURE: 76 MMHG

## 2021-10-08 RX ORDER — LORAZEPAM 2 MG/ML
1 INJECTION INTRAMUSCULAR ONCE
Status: COMPLETED | OUTPATIENT
Start: 2021-10-08 | End: 2021-10-08

## 2021-10-08 RX ORDER — LORAZEPAM 1 MG/1
1 TABLET ORAL ONCE
Status: DISCONTINUED | OUTPATIENT
Start: 2021-10-08 | End: 2021-10-08

## 2021-10-08 NOTE — ED QUICK NOTES
[facility] ambulance set up for pt. -200. Report called into Silvestre SUMMERS at [facility].

## 2021-10-08 NOTE — ED INITIAL ASSESSMENT (HPI)
Pt arrived via EMS from [facility] s/p fall with Head laceration. H/o Dementia. Per EMS pt at baseline. Pt not of Blood thinners.

## 2021-10-08 NOTE — ED PROVIDER NOTES
Patient Seen in: [facility] AND [facility] Emergency Department      History   Patient presents with:  Fall    Stated Complaint:     Subjective:   HPI    Patient is a 59-year-old male with history of severe dementia from nursing home reportedly fell tonight.   He Physical Exam    Constitutional: Patient is awake looking around yelling at staff.   HEENT:   Head: Normocephalic is a superficial abrasion to his mid forehead  Right Ear: External ear normal.   Left Ear: External ear normal.   Nose: Nose normal. hemorrhage, midline shift, mass-effect. Cervical spine: No evidence of acute cervical spine fracture or traumatic malalignment. Multilevel DDD. Case faxed/finalized at [address].   If there are any questions please contact me at [phone], exte

## 2021-10-18 ENCOUNTER — APPOINTMENT (OUTPATIENT)
Dept: GENERAL RADIOLOGY | Facility: HOSPITAL | Age: 76
DRG: 291 | End: 2021-10-18
Attending: EMERGENCY MEDICINE
Payer: MEDICARE

## 2021-10-18 ENCOUNTER — HOSPITAL ENCOUNTER (INPATIENT)
Facility: HOSPITAL | Age: 76
LOS: 5 days | Discharge: HOME | DRG: 291 | End: 2021-10-24
Attending: EMERGENCY MEDICINE | Admitting: INTERNAL MEDICINE
Payer: MEDICARE

## 2021-10-18 ENCOUNTER — APPOINTMENT (OUTPATIENT)
Dept: ULTRASOUND IMAGING | Facility: HOSPITAL | Age: 76
DRG: 291 | End: 2021-10-18
Attending: EMERGENCY MEDICINE
Payer: MEDICARE

## 2021-10-18 DIAGNOSIS — I50.9 ACUTE ON CHRONIC CONGESTIVE HEART FAILURE, UNSPECIFIED HEART FAILURE TYPE (HCC): Primary | ICD-10-CM

## 2021-10-18 DIAGNOSIS — I87.2 VENOUS STASIS DERMATITIS OF BOTH LOWER EXTREMITIES: ICD-10-CM

## 2021-10-18 PROCEDURE — 71045 X-RAY EXAM CHEST 1 VIEW: CPT | Performed by: EMERGENCY MEDICINE

## 2021-10-18 PROCEDURE — 93970 EXTREMITY STUDY: CPT | Performed by: EMERGENCY MEDICINE

## 2021-10-18 RX ORDER — LORAZEPAM 2 MG/ML
1 INJECTION INTRAMUSCULAR ONCE
Status: COMPLETED | OUTPATIENT
Start: 2021-10-18 | End: 2021-10-18

## 2021-10-18 RX ORDER — FUROSEMIDE 10 MG/ML
40 INJECTION INTRAMUSCULAR; INTRAVENOUS ONCE
Status: COMPLETED | OUTPATIENT
Start: 2021-10-18 | End: 2021-10-18

## 2021-10-19 ENCOUNTER — APPOINTMENT (OUTPATIENT)
Dept: CV DIAGNOSTICS | Facility: HOSPITAL | Age: 76
DRG: 291 | End: 2021-10-19
Attending: INTERNAL MEDICINE
Payer: MEDICARE

## 2021-10-19 PROBLEM — I50.9 ACUTE ON CHRONIC CONGESTIVE HEART FAILURE, UNSPECIFIED HEART FAILURE TYPE (HCC): Status: ACTIVE | Noted: 2021-01-01

## 2021-10-19 PROBLEM — I87.2 VENOUS STASIS DERMATITIS OF BOTH LOWER EXTREMITIES: Status: ACTIVE | Noted: 2021-01-01

## 2021-10-19 PROBLEM — I87.2 VENOUS STASIS DERMATITIS OF BOTH LOWER EXTREMITIES: Status: ACTIVE | Noted: 2021-10-19

## 2021-10-19 PROBLEM — I50.9 ACUTE ON CHRONIC CONGESTIVE HEART FAILURE, UNSPECIFIED HEART FAILURE TYPE (HCC): Status: ACTIVE | Noted: 2021-10-19

## 2021-10-19 RX ORDER — HALOPERIDOL 5 MG/ML
0.5 INJECTION INTRAMUSCULAR 2 TIMES DAILY PRN
Status: DISCONTINUED | OUTPATIENT
Start: 2021-10-19 | End: 2021-10-19

## 2021-10-19 RX ORDER — PANTOPRAZOLE SODIUM 40 MG/1
40 TABLET, DELAYED RELEASE ORAL
Status: DISCONTINUED | OUTPATIENT
Start: 2021-10-19 | End: 2021-10-24

## 2021-10-19 RX ORDER — ONDANSETRON 4 MG/1
4 TABLET, ORALLY DISINTEGRATING ORAL EVERY 8 HOURS PRN
COMMUNITY

## 2021-10-19 RX ORDER — HALOPERIDOL 5 MG/ML
0.5 INJECTION INTRAMUSCULAR EVERY 6 HOURS PRN
Status: COMPLETED | OUTPATIENT
Start: 2021-10-19 | End: 2021-10-19

## 2021-10-19 RX ORDER — LISINOPRIL 10 MG/1
10 TABLET ORAL DAILY
Refills: 3 | Status: DISCONTINUED | OUTPATIENT
Start: 2021-10-19 | End: 2021-10-24

## 2021-10-19 RX ORDER — ONDANSETRON 2 MG/ML
4 INJECTION INTRAMUSCULAR; INTRAVENOUS EVERY 6 HOURS PRN
Status: DISCONTINUED | OUTPATIENT
Start: 2021-10-19 | End: 2021-10-24

## 2021-10-19 RX ORDER — HALOPERIDOL 5 MG/ML
1 INJECTION INTRAMUSCULAR ONCE
Status: COMPLETED | OUTPATIENT
Start: 2021-10-19 | End: 2021-10-19

## 2021-10-19 RX ORDER — ACETAMINOPHEN 325 MG/1
325 TABLET ORAL EVERY 6 HOURS PRN
COMMUNITY

## 2021-10-19 RX ORDER — SENNA AND DOCUSATE SODIUM 50; 8.6 MG/1; MG/1
1 TABLET, FILM COATED ORAL DAILY PRN
Status: DISCONTINUED | OUTPATIENT
Start: 2021-10-19 | End: 2021-10-24

## 2021-10-19 RX ORDER — POTASSIUM CHLORIDE 29.8 MG/ML
40 INJECTION INTRAVENOUS ONCE
Status: DISCONTINUED | OUTPATIENT
Start: 2021-10-19 | End: 2021-10-19

## 2021-10-19 RX ORDER — HEPARIN SODIUM 5000 [USP'U]/ML
5000 INJECTION, SOLUTION INTRAVENOUS; SUBCUTANEOUS EVERY 8 HOURS SCHEDULED
Status: DISCONTINUED | OUTPATIENT
Start: 2021-10-19 | End: 2021-10-24

## 2021-10-19 RX ORDER — TRIAMTERENE AND HYDROCHLOROTHIAZIDE 37.5; 25 MG/1; MG/1
1 CAPSULE ORAL DAILY
Status: DISCONTINUED | OUTPATIENT
Start: 2021-10-19 | End: 2021-10-19

## 2021-10-19 RX ORDER — LORAZEPAM 1 MG/1
1 TABLET ORAL EVERY 4 HOURS PRN
Status: DISCONTINUED | OUTPATIENT
Start: 2021-10-19 | End: 2021-10-20

## 2021-10-19 RX ORDER — POTASSIUM CHLORIDE 20 MEQ/1
40 TABLET, EXTENDED RELEASE ORAL EVERY 4 HOURS
Status: DISCONTINUED | OUTPATIENT
Start: 2021-10-19 | End: 2021-10-19

## 2021-10-19 RX ORDER — AMOXICILLIN 250 MG
1 CAPSULE ORAL DAILY PRN
COMMUNITY

## 2021-10-19 RX ORDER — LORAZEPAM 2 MG/ML
1 INJECTION INTRAMUSCULAR 2 TIMES DAILY PRN
Status: DISCONTINUED | OUTPATIENT
Start: 2021-10-19 | End: 2021-10-20

## 2021-10-19 RX ORDER — MEMANTINE HYDROCHLORIDE 10 MG/1
10 TABLET ORAL 2 TIMES DAILY
Status: DISCONTINUED | OUTPATIENT
Start: 2021-10-19 | End: 2021-10-24

## 2021-10-19 RX ORDER — ATORVASTATIN CALCIUM 20 MG/1
20 TABLET, FILM COATED ORAL NIGHTLY
Refills: 3 | Status: DISCONTINUED | OUTPATIENT
Start: 2021-10-19 | End: 2021-10-24

## 2021-10-19 RX ORDER — RISPERIDONE 0.5 MG/1
0.5 TABLET, FILM COATED ORAL 2 TIMES DAILY
Status: DISCONTINUED | OUTPATIENT
Start: 2021-10-19 | End: 2021-10-24

## 2021-10-19 RX ORDER — METOCLOPRAMIDE HYDROCHLORIDE 5 MG/ML
10 INJECTION INTRAMUSCULAR; INTRAVENOUS EVERY 8 HOURS PRN
Status: DISCONTINUED | OUTPATIENT
Start: 2021-10-19 | End: 2021-10-24

## 2021-10-19 RX ORDER — ACETAMINOPHEN 325 MG/1
650 TABLET ORAL EVERY 6 HOURS PRN
Status: DISCONTINUED | OUTPATIENT
Start: 2021-10-19 | End: 2021-10-24

## 2021-10-19 RX ORDER — HALOPERIDOL 5 MG/ML
0.5 INJECTION INTRAMUSCULAR 2 TIMES DAILY
Status: DISCONTINUED | OUTPATIENT
Start: 2021-10-19 | End: 2021-10-19

## 2021-10-19 RX ORDER — LORAZEPAM 1 MG/1
1 TABLET ORAL EVERY 4 HOURS PRN
COMMUNITY

## 2021-10-19 RX ORDER — METOPROLOL SUCCINATE 25 MG/1
25 TABLET, EXTENDED RELEASE ORAL
Status: DISCONTINUED | OUTPATIENT
Start: 2021-10-19 | End: 2021-10-24

## 2021-10-19 RX ORDER — RISPERIDONE 0.5 MG/1
0.5 TABLET, FILM COATED ORAL 2 TIMES DAILY
COMMUNITY

## 2021-10-19 RX ORDER — FUROSEMIDE 10 MG/ML
40 INJECTION INTRAMUSCULAR; INTRAVENOUS
Status: DISCONTINUED | OUTPATIENT
Start: 2021-10-19 | End: 2021-10-21

## 2021-10-19 NOTE — PLAN OF CARE
Patient not oriented or able to follow commands. Restraints continued for confusion, agitation, and being verbally abusive to staff. IV lasix given. PO ativan given as needed and plan is for echo.      Problem: Patient Centered Care  Goal: Patient preferenc

## 2021-10-19 NOTE — PLAN OF CARE
Pt admitted for lower extremity edema. IV lasix given in ED. Primofit intact. NSR on tele. Pt A&Ox0, combative, agitated. IM Haldol given x2. Bilateral soft wrist restraints applied. Frequent rounding performed. Will continue to monitor.       Problem: Shaylee

## 2021-10-19 NOTE — CONSULTS
[facility]    Report of Consultation    Bob Parry Patient Status:  Inpatient    1945 MRN [id number]   Location [facility] 3W/SW Attending Russell Chong MD   Hosp Day # 0 PCP Stacey Stapleton MD     Date of Adm OR REPORT      REMOVAL BENIGN CYST ON NECK   • SPECIAL SERVICE OR REPORT  2008    BASAL CELL CA NOSE AND LEFT TEMPLE; HAD MOH'S PROCEDURE WITH DR Lino Salazar       Family History  No family history on file.     Social History  Social History    Tobacco Use MG Oral Tab, Take 325 mg by mouth every 6 (six) hours as needed for Pain. Memantine HCl 5 MG Oral Tab, Take 10 mg by mouth 2 (two) times daily. furosemide 20 MG Oral Tab, Take 20 mg by mouth 2 (two) times daily.  Was taking 40mg TID  Pantoprazole Sodium 4 distension. Palpations: There is no mass. Tenderness: There is no abdominal tenderness. There is no guarding. Hernia: No hernia is present. Genitourinary:     Comments: Deferred  Musculoskeletal:         General: Swelling present.  No tende abdominal distention with lower abdominal edema. Findings are suggestive of general anasarca. Evaluate for systolic or diastolic heart failure. BNP is only modestly elevated at 478  Work-up negative for DVT  #2. Known history of chronic dementia.   Shaylee

## 2021-10-19 NOTE — ED QUICK NOTES
Orders for admission, patient is aware of plan and ready to go upstairs. Any questions, please call ED RN Radha Lopez at extension [phone].    Type of COVID test sent:  COVID Suspicion level: negative        LOC at time of transport:  Alert x 0

## 2021-10-19 NOTE — ED QUICK NOTES
Patient attempting to hit and bite staff during assessment. He repeatedly postures to strike and using profanities. Bilateral wrist restraints applied for staff and patient safety.

## 2021-10-19 NOTE — ED PROVIDER NOTES
Patient Seen in: [facility] AND [facility] Emergency Department      History   Patient presents with:  Swelling Edema    Stated Complaint: lower extremity edema    Subjective:   HPI    The patient is a 51-year-old male with a history of advanced dementia sent fr 120/77   Pulse 99   Temp 98.5 °F (36.9 °C) (Temporal)   Resp 20   Ht 177.8 cm (5' 10\")   Wt 113.4 kg   SpO2 93%   BMI 35.87 kg/m²         Physical Exam  Vitals and nursing note reviewed. Constitutional:       General: He is not in acute distress.      Ap Labs Reviewed   BASIC METABOLIC PANEL (8) - Abnormal; Notable for the following components:       Result Value    Glucose 108 (*)     CO2 33.0 (*)     GFR, Non- 56 (*)     All other components within normal limits   PRO BETA NATRIURETIC from Last 1 Encounters:  10/18/21 : 99  , Possible atrial fibrillation, normal    Radiology findings: XR CHEST AP PORTABLE  (CPT=71045)    Result Date: 10/18/2021  CONCLUSION:   Cardiomegaly with mild vascular congestion.   No significant    Dictated by (CS

## 2021-10-19 NOTE — H&P
[address]  [facility]:  [facility]    1945 MRN [id number]   Location [facility] 3W/SW Attending Angelica Reyes MD   Hosp Day # 0 PCP Opal Younger MD     Date:  10/19/2021 Medications:  LORazepam 1 MG Oral Tab, Take 1 mg by mouth every 4 (four) hours as needed for Anxiety. , Disp: , Rfl:   ondansetron 4 MG Oral Tablet Dispersible, Take 4 mg by mouth every 8 (eight) hours as needed for Nausea., Disp: , Rfl:   sennosides-docusa Temp 97.4 °F (36.3 °C) (Axillary)   Resp 16   Ht 5' 10\" (1.778 m)   Wt 208 lb 2 oz (94.4 kg)   SpO2 100%   BMI 29.86 kg/m²     General Appearance:  Alert, Awake, not in acute distress.   HEENT: PERRLA  Neck: Supple, no carotid bruit or JVD  Lungs: Clear done, result pending  CODE STATUS: DNAR/marichuy Caceres MD  10/19/2021  9:33 AM

## 2021-10-19 NOTE — ED INITIAL ASSESSMENT (HPI)
Brought by [facility] from [facility] for lower extremity edema ongoing for many months. EMS was told by NH staff that his physician wanted to \"adjust his lasix. \"   Hx of dementia, combative with paramedics.

## 2021-10-20 ENCOUNTER — APPOINTMENT (OUTPATIENT)
Dept: CV DIAGNOSTICS | Facility: HOSPITAL | Age: 76
DRG: 291 | End: 2021-10-20
Attending: INTERNAL MEDICINE
Payer: MEDICARE

## 2021-10-20 PROBLEM — F01.51 MIXED VASCULAR AND NEURODEGENERATIVE DEMENTIA WITH BEHAVIORAL DISTURBANCE (HCC): Status: ACTIVE | Noted: 2021-01-01

## 2021-10-20 PROBLEM — F01.51 MIXED VASCULAR AND NEURODEGENERATIVE DEMENTIA WITH BEHAVIORAL DISTURBANCE (HCC): Status: ACTIVE | Noted: 2021-10-20

## 2021-10-20 PROBLEM — F05 DELIRIUM SUPERIMPOSED ON DEMENTIA: Status: ACTIVE | Noted: 2021-10-20

## 2021-10-20 PROBLEM — F39 EPISODIC MOOD DISORDER (HCC): Status: ACTIVE | Noted: 2021-01-01

## 2021-10-20 PROBLEM — F05 DELIRIUM SUPERIMPOSED ON DEMENTIA: Status: ACTIVE | Noted: 2021-01-01

## 2021-10-20 PROBLEM — F39 EPISODIC MOOD DISORDER (HCC): Status: ACTIVE | Noted: 2021-10-20

## 2021-10-20 PROCEDURE — 93306 TTE W/DOPPLER COMPLETE: CPT | Performed by: INTERNAL MEDICINE

## 2021-10-20 PROCEDURE — 90792 PSYCH DIAG EVAL W/MED SRVCS: CPT | Performed by: OTHER

## 2021-10-20 RX ORDER — QUETIAPINE 25 MG/1
25 TABLET, FILM COATED ORAL NIGHTLY
Status: DISCONTINUED | OUTPATIENT
Start: 2021-10-20 | End: 2021-10-21

## 2021-10-20 RX ORDER — LORAZEPAM 2 MG/ML
0.5 INJECTION INTRAMUSCULAR 2 TIMES DAILY PRN
Status: DISCONTINUED | OUTPATIENT
Start: 2021-10-20 | End: 2021-10-24

## 2021-10-20 RX ORDER — DIVALPROEX SODIUM 125 MG/1
125 CAPSULE, COATED PELLETS ORAL 2 TIMES DAILY
Status: DISCONTINUED | OUTPATIENT
Start: 2021-10-20 | End: 2021-10-21

## 2021-10-20 NOTE — PROGRESS NOTES
Patient seen in follow up. Patient is confused and in soft restraints. Unable to obtain history from patient.  Seen with Lacy PRAASD.    10/20/21  0746   BP: 126/72   Pulse: 92   Resp: 20   Temp: 98.3 °F (36.8 °C)       Intake/Output Summary (Last mouth every 8 (eight) hours as needed for Nausea. sennosides-docusate (SENEXON-S) 8.6-50 MG Oral Tab, Take 1 tablet by mouth daily as needed for constipation. risperiDONE 0.5 MG Oral Tab, Take 0.5 mg by mouth 2 (two) times daily.   acetaminophen 325 MG Or Patient is a nursing home resident. #3.  Cardiomegaly on chest x-ray with mild congestive changes. Echocardiogram is pending. Evaluate for systolic or diastolic heart failure.     #4.  History of hypertension, hyperlipidemia and obstructive sleep apnea

## 2021-10-20 NOTE — CONSULTS
[facility] HOSP - [facility]    Report of Consultation    [facility] Patient Status:  Inpatient    1945 MRN [id number]   Location [facility] 3W/SW Attending Jamaica Hernandez MD   Hosp Day # 1 PCP Juliane Koyanagi, MD     Date of Adm standing in the door of his room    Sandrine Brunson reports that Triny Cárdenas has a long term hx of depression, stating that he was adopted as a child and his parents were gone a lot traveling so they'd leave him with extended family or boarding school when they were g History:   Diagnosis Date   • Arthropathy, unspecified, site unspecified    • Dementia ([facility] [address]    • Depression    • Esophageal reflux    • HYPERTENSION    • Obstructive sleep apnea (adult) (pediatric)    • Other and unspecified hyperlipidemia    • Personal h PRN  sertraline (ZOLOFT) tab 75 mg, 75 mg, Oral, Nightly  atorvastatin (LIPITOR) tab 20 mg, 20 mg, Oral, Nightly  furosemide (LASIX) injection 40 mg, 40 mg, Intravenous, BID (Diuretic)  metoprolol succinate (Toprol XL) 24 hr tab 25 mg, 25 mg, Oral, Daily B Imaging:  US VENOUS DOPPLER LEG BILAT - DIAG IM (CPT=93970)    Result Date: 10/19/2021  CONCLUSION:  1. No evidence of lower extremity DVT bilaterally. 2. Subcutaneous edema lower legs. 3. No major discrepancy with preliminary [facility] radiology report. the clearly from the presentation documentation is severely impaired. Intellect is impaired due to his dementia. Judgment and insight is impaired due to cognitive impairment and lack of cooperation.       Impression:     Impression:        Delirium superi

## 2021-10-20 NOTE — CM/SW NOTE
10/20/21 1600   CM/SW Referral Data   Referral Source    Reason for Referral Discharge planning   Pertinent Medical Hx   Does patient have an established PCP?  Yes   Patient [address]   Post Acute

## 2021-10-20 NOTE — BH PROGRESS NOTE
Behavioral Health Note:  REASON FOR ADMISSION:   Swelling edema    REASON FOR CONSULT:  Psychiatry consultation requested for evaluation and advice d/t delirium superimposed on dementia     OBJECTIVE:  Michelle Barry is a  68year old male who presents d/ she reports no onset of depressive sx recently. Lulu Degroot denies any known hx or onset of anxiety for Dona Bustos, however, the reports of his triggers to agitation seem to exhibit that of anxiety.      Lulu Degroot reports that [facility] has reported no issues with

## 2021-10-20 NOTE — PROGRESS NOTES
[facility]    Progress Note    Shahnaz Ferrell Patient Status:  Inpatient    1945 MRN [id number]   Location [facility] 3W/SW Attending Maxwell Merritt MD   Hosp Day # 1 PCP Alex Jovel MD     SUBJECTIVE:  Pt seen a mg, Oral, Nightly  atorvastatin (LIPITOR) tab 20 mg, 20 mg, Oral, Nightly  furosemide (LASIX) injection 40 mg, 40 mg, Intravenous, BID (Diuretic)  metoprolol succinate (Toprol XL) 24 hr tab 25 mg, 25 mg, Oral, Daily Beta Blocker  LORazepam (ATIVAN) injecti

## 2021-10-20 NOTE — PLAN OF CARE
Pt disoriented, at risk of removing lines, tele and is a fall risk. Pt maintained on restraints for safety. Bed is low and locked in place.   Problem: Patient Centered Care  Goal: Patient preferences are identified and integrated in the patient's plan of ca Problem: CARDIOVASCULAR - ADULT  Goal: Maintains optimal cardiac output and hemodynamic stability  Description: INTERVENTIONS:  - Monitor vital signs, rhythm, and trends  - Monitor for bleeding, hypotension and signs of decreased cardiac output  - Evalua

## 2021-10-20 NOTE — PLAN OF CARE
Patient continues to be confused and agitated. Restraints in place. Ativan given for agitation. Plan for echo today.     Problem: Patient Centered Care  Goal: Patient preferences are identified and integrated in the patient's plan of care  Description: Inte Document I/O and daily weights  - See additional Care Plan goals for specific interventions  Outcome: Not Progressing  Goal: Patient/Family Short Term Goal  Description: Patient's Short Term Goal: to feel better    Interventions:   - administer medications

## 2021-10-21 PROCEDURE — 99232 SBSQ HOSP IP/OBS MODERATE 35: CPT | Performed by: OTHER

## 2021-10-21 RX ORDER — DIVALPROEX SODIUM 125 MG/1
250 CAPSULE, COATED PELLETS ORAL 2 TIMES DAILY
Status: DISCONTINUED | OUTPATIENT
Start: 2021-10-21 | End: 2021-10-24

## 2021-10-21 RX ORDER — POTASSIUM CHLORIDE 20 MEQ/1
40 TABLET, EXTENDED RELEASE ORAL ONCE
Status: COMPLETED | OUTPATIENT
Start: 2021-10-21 | End: 2021-10-21

## 2021-10-21 RX ORDER — FUROSEMIDE 40 MG/1
40 TABLET ORAL DAILY
Status: DISCONTINUED | OUTPATIENT
Start: 2021-10-21 | End: 2021-10-22

## 2021-10-21 RX ORDER — QUETIAPINE 25 MG/1
25 TABLET, FILM COATED ORAL 2 TIMES DAILY
Status: DISCONTINUED | OUTPATIENT
Start: 2021-10-21 | End: 2021-10-24

## 2021-10-21 NOTE — PROGRESS NOTES
[facility]    Progress Note    Nico Rushing Patient Status:  Inpatient    1945 MRN [id number]   Location [facility] 3W/SW Attending Soo Tatum MD   [facility] Day # 2 PCP Sandrita Hollins MD     SUBJECTIVE:  Pt seen a 40 mg, Oral, QAM AC  lisinopril tab 10 mg, 10 mg, Oral, Daily  risperiDONE (RISPERDAL) tab 0.5 mg, 0.5 mg, Oral, BID  Senna-Docusate Sodium (SENOKOT S) 8.6-50 MG tab 1 tablet, 1 tablet, Oral, Daily PRN  atorvastatin (LIPITOR) tab 20 mg, 20 mg, Oral, Nightl

## 2021-10-21 NOTE — PLAN OF CARE
Problem: Patient Centered Care  Goal: Patient preferences are identified and integrated in the patient's plan of care  Description: Interventions:  - What would you like us to know as we care for you?  I live at [facility]  - Provide timely, vital signs, rhythm, and trends  - Monitor for bleeding, hypotension and signs of decreased cardiac output  - Evaluate effectiveness of vasoactive medications to optimize hemodynamic stability  - Monitor arterial and/or venous puncture sites for bleeding a

## 2021-10-21 NOTE — PROGRESS NOTES
Patient is a 68year old  male with a hx of advanced dementia who was admitted to the hospital for Acute on chronic congestive heart failure and delirium.      Consult Duration     The patient seen for over 25-minute, follow-up evaluation, over 5 Medical History:   Diagnosis Date   • Arthropathy, unspecified, site unspecified    • Dementia ([facility] [address]    • Depression    • Esophageal reflux    • HYPERTENSION    • Obstructive sleep apnea (adult) (pediatric)    • Other and unspecified hyperlipidemia    • Pe (SENOKOT S) 8.6-50 MG tab 1 tablet, 1 tablet, Oral, Daily PRN  atorvastatin (LIPITOR) tab 20 mg, 20 mg, Oral, Nightly  furosemide (LASIX) injection 40 mg, 40 mg, Intravenous, BID (Diuretic)  metoprolol succinate (Toprol XL) 24 hr tab 25 mg, 25 mg, Oral, Da Imaging:  US VENOUS DOPPLER LEG BILAT - DIAG IM (CPT=93970)    Result Date: 10/19/2021  CONCLUSION:  1. No evidence of lower extremity DVT bilaterally. 2. Subcutaneous edema lower legs. 3. No major discrepancy with preliminary [facility] radiology report. cognitive impairment and lack of cooperation. Impression:     Impression:        Delirium superimposed on dementia. 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 Episodic mood disorder.   Acute on chronic congestive

## 2021-10-21 NOTE — CM/SW NOTE
BPCI-Advanced Medicare Program Note:  Plan of care reviewed for care coordination and discharge planning. Noted patient falls under  BPCI/Medicare program, with  for CHF. JUAN tool was used to help determine next care setting.  Thus, [address] recommend

## 2021-10-21 NOTE — PLAN OF CARE
Attempted to remove restraints, but the patient was not cooperative. Pt is A&Ox1- no complaints of pain at this time. Pt declined to eat dinner, he did take pills as asked with applesauce, and tolerated oral care for a very limited time. Iv abx maintained. needs   - Reorient and redirection as needed  - Assess for the need to continue restraints  Outcome: Not Progressing     Problem: CARDIOVASCULAR - ADULT  Goal: Maintains optimal cardiac output and hemodynamic stability  Description: INTERVENTIONS:  - Monit

## 2021-10-21 NOTE — PROGRESS NOTES
Patient seen in follow up. Patient is confused and in soft restraints. Unable to obtain history from patient.     10/21/21  0948   BP: 119/78   Pulse: 84   Resp: 18   Temp:        Intake/Output Summary (Last 24 hours) at 10/21/2021 1350  Last data mario Tablet Dispersible, Take 4 mg by mouth every 8 (eight) hours as needed for Nausea. sennosides-docusate (SENEXON-S) 8.6-50 MG Oral Tab, Take 1 tablet by mouth daily as needed for constipation.   risperiDONE 0.5 MG Oral Tab, Take 0.5 mg by mouth 2 (two) time Cardiomegaly on chest x-ray with mild congestive changes. Likely diastolic heart failure even though echo was inconclusive. #4.  History of hypertension, hyperlipidemia and obstructive sleep apnea. PLAN:     - Echo with normal systolic function.  Kym

## 2021-10-22 PROCEDURE — 99232 SBSQ HOSP IP/OBS MODERATE 35: CPT | Performed by: OTHER

## 2021-10-22 RX ORDER — DIVALPROEX SODIUM 125 MG/1
250 CAPSULE, COATED PELLETS ORAL 2 TIMES DAILY
Qty: 60 CAPSULE | Refills: 0 | Status: SHIPPED | OUTPATIENT
Start: 2021-10-22

## 2021-10-22 RX ORDER — TORSEMIDE 20 MG/1
40 TABLET ORAL DAILY
Status: DISCONTINUED | OUTPATIENT
Start: 2021-10-22 | End: 2021-10-23

## 2021-10-22 RX ORDER — SPIRONOLACTONE 25 MG/1
25 TABLET ORAL DAILY
Qty: 90 TABLET | Refills: 0 | Status: SHIPPED | OUTPATIENT
Start: 2021-10-23

## 2021-10-22 RX ORDER — QUETIAPINE 25 MG/1
25 TABLET, FILM COATED ORAL 2 TIMES DAILY
Qty: 90 TABLET | Refills: 0 | Status: SHIPPED | OUTPATIENT
Start: 2021-10-22

## 2021-10-22 RX ORDER — SPIRONOLACTONE 25 MG/1
25 TABLET ORAL DAILY
Status: DISCONTINUED | OUTPATIENT
Start: 2021-10-22 | End: 2021-10-24

## 2021-10-22 RX ORDER — TORSEMIDE 20 MG/1
40 TABLET ORAL DAILY
Qty: 90 TABLET | Refills: 0 | Status: SHIPPED | OUTPATIENT
Start: 2021-10-23

## 2021-10-22 RX ORDER — METOPROLOL SUCCINATE 25 MG/1
25 TABLET, EXTENDED RELEASE ORAL
Qty: 90 TABLET | Refills: 0 | Status: SHIPPED | OUTPATIENT
Start: 2021-10-23

## 2021-10-22 RX ORDER — POTASSIUM CHLORIDE 20 MEQ/1
40 TABLET, EXTENDED RELEASE ORAL EVERY 4 HOURS
Status: COMPLETED | OUTPATIENT
Start: 2021-10-22 | End: 2021-10-22

## 2021-10-22 NOTE — PLAN OF CARE
Pt continues to require the restraints due to aggression to staff and a risk to his safety.     Problem: Patient Centered Care  Goal: Patient preferences are identified and integrated in the patient's plan of care  Description: Interventions:  - What would optimal cardiac output and hemodynamic stability  Description: INTERVENTIONS:  - Monitor vital signs, rhythm, and trends  - Monitor for bleeding, hypotension and signs of decreased cardiac output  - Evaluate effectiveness of vasoactive medications to optim

## 2021-10-22 NOTE — PROGRESS NOTES
Patient seen in follow up. Patient is confused and in soft restraints. Unable to obtain history from patient.  Seen with Lacy PRASAD.    10/22/21  0901   BP: 128/67   Pulse: 68   Resp: 18   Temp: 97.5 °F (36.4 °C)       Intake/Output Summary (Last Anxiety. ondansetron 4 MG Oral Tablet Dispersible, Take 4 mg by mouth every 8 (eight) hours as needed for Nausea. sennosides-docusate (SENEXON-S) 8.6-50 MG Oral Tab, Take 1 tablet by mouth daily as needed for constipation.   risperiDONE 0.5 MG Oral Tab, T inconclusive. #4.  History of hypertension, hyperlipidemia and obstructive sleep apnea. PLAN:     - Echo with normal systolic function.  Diastology cannot be assessed but likely related to diastolic dysfunction.  - Change to torsemide 40/d and add ald

## 2021-10-22 NOTE — PROGRESS NOTES
[facility]    Progress Note    Aly Alvarez Patient Status:  Inpatient    1945 MRN [id number]   Location [facility] 3W/SW Attending Tiffanie Keys MD   Hosp Day # 3 PCP Yohan Newton MD     SUBJECTIVE:  Pt seen a high dose for 65 yrs & older inj 0.7ml, 0.7 mL, Intramuscular, Prior to discharge  Memantine HCl (NAMENDA) tab 10 mg, 10 mg, Oral, BID  pantoprazole (PROTONIX) EC tab 40 mg, 40 mg, Oral, QAM AC  lisinopril tab 10 mg, 10 mg, Oral, Daily  risperiDONE (Tiffany Sails

## 2021-10-23 RX ORDER — TORSEMIDE 20 MG/1
20 TABLET ORAL DAILY
Status: DISCONTINUED | OUTPATIENT
Start: 2021-10-24 | End: 2021-10-24

## 2021-10-23 NOTE — PLAN OF CARE
Pt restless overnight. Ativan given for agitation. Restraints continued.    Problem: Patient Centered Care  Goal: Patient preferences are identified and integrated in the patient's plan of care  Description: Interventions:  - What would you like us to know output and hemodynamic stability  Description: INTERVENTIONS:  - Monitor vital signs, rhythm, and trends  - Monitor for bleeding, hypotension and signs of decreased cardiac output  - Evaluate effectiveness of vasoactive medications to optimize hemodynamic

## 2021-10-23 NOTE — PROGRESS NOTES
Patient is a 68year old  male with a hx of advanced dementia who was admitted to the hospital for Acute on chronic congestive heart failure and delirium.      Consult Duration     The patient seen for over 25-minute, follow-up evaluation, over 5 [facility])    • Depression    • Esophageal reflux    • HYPERTENSION    • Obstructive sleep apnea (adult) (pediatric)    • Other and unspecified hyperlipidemia    • Personal history of other malignant neoplasm of skin 4-10-02    BASAL CELL CA NOSE; TX WITH DR COOK Daily  risperiDONE (RISPERDAL) tab 0.5 mg, 0.5 mg, Oral, BID  Senna-Docusate Sodium (SENOKOT S) 8.6-50 MG tab 1 tablet, 1 tablet, Oral, Daily PRN  atorvastatin (LIPITOR) tab 20 mg, 20 mg, Oral, Nightly  metoprolol succinate (Toprol XL) 24 hr tab 25 mg, 25 2.650 10/28/2010    PSA 1.1 10/28/2010         Imaging:  No results found. Vital Signs:     Blood pressure 97/62, pulse 83, temperature 97.7 °F (36.5 °C), temperature source Oral, resp. rate 18, height 70\", weight 92.4 kg (203 lb 11.2 oz), SpO2 94 %. symptom and severity. At this point, I would recommend the following approach:     1. Focus on education and support. 2.  Continue Namenda 10 mg twice daily. 3.  Continue risperidone 0.5 mg twice daily. 4.  Continue Depakote to 250 mg twice daily.   5.

## 2021-10-23 NOTE — PLAN OF CARE
Patient is resting in bed, alert and oriented to self, primofit in place, on tele, administered one dose of ativan and continued use of restraints due to agitation and risk to his own safety, fall precautions in place, call light within reach.     Problem: Reorient and redirection as needed  - Assess for the need to continue restraints  Outcome: Progressing     Problem: CARDIOVASCULAR - ADULT  Goal: Maintains optimal cardiac output and hemodynamic stability  Description: INTERVENTIONS:  - Monitor vital signs

## 2021-10-23 NOTE — PLAN OF CARE
Patient is alert and oriented to self only. Restraints removed at 1130. Primofit on. Denies pain. Bilateral swelling in lower legs. Frequent rounding.      Problem: Patient Centered Care  Goal: Patient preferences are identified and integrated in the patie Progressing     Problem: CARDIOVASCULAR - ADULT  Goal: Maintains optimal cardiac output and hemodynamic stability  Description: INTERVENTIONS:  - Monitor vital signs, rhythm, and trends  - Monitor for bleeding, hypotension and signs of decreased cardiac ou

## 2021-10-24 VITALS
OXYGEN SATURATION: 94 % | SYSTOLIC BLOOD PRESSURE: 108 MMHG | HEART RATE: 81 BPM | BODY MASS INDEX: 27.11 KG/M2 | HEIGHT: 70 IN | RESPIRATION RATE: 18 BRPM | TEMPERATURE: 98 F | DIASTOLIC BLOOD PRESSURE: 77 MMHG | WEIGHT: 189.38 LBS

## 2021-10-24 NOTE — PLAN OF CARE
Pt alert, confused. No restraints in place. Pt does not attempt to get out of bed. Did remove external catheter. Depends place. IV remains in tact. VSS. Call light within easy reach. No acute distress noted.     Problem: CARDIOVASCULAR - ADULT  Goal

## 2021-10-24 NOTE — CM/SW NOTE
Plan: [facility] on discharge. Will need to be off restraints for 24 hours. Advanced Dementia. Restraints off since 11:30 AM 10/23    SW/CM to remain available for support and/or discharge planning.          Naomi Aguilera RN Case Manager   Ext. [phone]

## 2021-10-24 NOTE — PROGRESS NOTES
Patient seen in follow up. More alert today. Not answering questions. D/w RN. Chart reviewed.      10/24/21  0900   BP: 108/77   Pulse: 81   Resp: 18   Temp: 98 °F (36.7 °C)       Intake/Output Summary (Last 24 hours) at 10/24/2021 1020  Last data mario Anxiety. ondansetron 4 MG Oral Tablet Dispersible, Take 4 mg by mouth every 8 (eight) hours as needed for Nausea. sennosides-docusate (SENEXON-S) 8.6-50 MG Oral Tab, Take 1 tablet by mouth daily as needed for constipation.   risperiDONE 0.5 MG Oral Tab, T a nursing home resident. #3.  Cardiomegaly on chest x-ray with mild congestive changes. Likely diastolic heart failure even though echo was inconclusive. #4.  History of hypertension, hyperlipidemia and obstructive sleep apnea.     PLAN:     - Echo w

## 2021-10-24 NOTE — CM/SW NOTE
10/24/21 1600   Discharge disposition   Expected discharge disposition [facility]  ( [address])   Discharge transportation [facility]   MDO received for patient to return to [facility] .    Patient has been medically c

## 2021-10-24 NOTE — PLAN OF CARE
Patient is alert and oriented to self, restless at times and irritable, on room air, on tele, VSS, call light within easy reach, continue to monitor.     1500- spoke with Dr. Ho Caballero, resume home furosemide for discharge, from cardiology standpoint okay for dis and/or venous puncture sites for bleeding and/or hematoma  - Assess quality of pulses, skin color and temperature  - Assess for signs of decreased coronary artery perfusion - ex.  Angina  - Evaluate fluid balance, assess for edema, trend weights  Outcome: P

## 2021-10-26 NOTE — DISCHARGE SUMMARY
[place] [facility]    Discharge Summary    Dante Mcguire Patient Status:  Inpatient    1945 MRN [id number]   Location [facility] 3W/SW Attending No att. providers found   [facility] Day # 5 PCP Bryan Bynum MD     Date of Ad Mildly elevated BNP. Seen and evaluated by cardiology 2D echo with normal ejection fraction but diastolic dysfunction.     Hospital Course:       1.  Bilateral lower extremity edema with elevated BNP.  Congestive heart failure:?  Systolic/diastolic  2D ech DEMADEX  Notes to patient: Discontinued medication      Take 2 tablets (40 mg total) by mouth daily.    Quantity: 90 tablet  Refills: 0        CONTINUE taking these medications      Instructions Prescription details   acetaminophen 325 MG Tabs  Commonly kno [address] [phone], [phone]  [address], [facility] [address]    Phone: [phone]   · QUEtiapine 25 MG Tabs  · spironolactone 25 MG Tabs     Please  your prescriptions at the location directed by your doctor or n

## 2021-10-26 NOTE — PROGRESS NOTES
[place] [facility]    Progress Note    Love Brar Patient Status:  Inpatient    1945 MRN [id number]   Location [facility] 3W/SW Attending No att. providers found   [facility] Day # 5 PCP Evelyne Dowell MD     SUBJECTIVE:  Pt

## 2022-01-01 ENCOUNTER — APPOINTMENT (OUTPATIENT)
Dept: GENERAL RADIOLOGY | Facility: HOSPITAL | Age: 77
DRG: 177 | End: 2022-01-01
Attending: EMERGENCY MEDICINE
Payer: MEDICARE

## 2022-01-01 ENCOUNTER — HOSPITAL ENCOUNTER (INPATIENT)
Facility: HOSPITAL | Age: 77
LOS: 1 days | DRG: 177 | End: 2022-01-01
Attending: EMERGENCY MEDICINE | Admitting: INTERNAL MEDICINE
Payer: MEDICARE

## 2022-01-01 ENCOUNTER — APPOINTMENT (OUTPATIENT)
Dept: GENERAL RADIOLOGY | Facility: HOSPITAL | Age: 77
DRG: 177 | End: 2022-01-01
Payer: MEDICARE

## 2022-01-01 VITALS
HEART RATE: 75 BPM | OXYGEN SATURATION: 79 % | BODY MASS INDEX: 31.43 KG/M2 | HEIGHT: 70 IN | DIASTOLIC BLOOD PRESSURE: 39 MMHG | SYSTOLIC BLOOD PRESSURE: 47 MMHG | WEIGHT: 219.56 LBS | RESPIRATION RATE: 35 BRPM | TEMPERATURE: 103 F

## 2022-01-01 DIAGNOSIS — J96.01 ACUTE RESPIRATORY FAILURE WITH HYPOXIA (HCC): Primary | ICD-10-CM

## 2022-01-01 DIAGNOSIS — J69.0 ASPIRATION PNEUMONIA OF RIGHT LOWER LOBE, UNSPECIFIED ASPIRATION PNEUMONIA TYPE (HCC): ICD-10-CM

## 2022-01-01 LAB
ANION GAP SERPL CALC-SCNC: 20 MMOL/L (ref 0–18)
BASOPHILS # BLD AUTO: 0.02 X10(3) UL (ref 0–0.2)
BASOPHILS NFR BLD AUTO: 0.2 %
BLOOD GAS EPAP: 6 CM H2O
BLOOD GAS IPAP: 14 CM H2O
BUN BLD-MCNC: 33 MG/DL (ref 7–18)
BUN/CREAT SERPL: 12.2 (ref 10–20)
CALCIUM BLD-MCNC: 8.7 MG/DL (ref 8.5–10.1)
CHLORIDE SERPL-SCNC: 106 MMOL/L (ref 98–112)
CHOLEST SERPL-MCNC: 173 MG/DL (ref ?–200)
CO2 SERPL-SCNC: 14 MMOL/L (ref 21–32)
CREAT BLD-MCNC: 2.71 MG/DL
DEPRECATED RDW RBC AUTO: 45.4 FL (ref 35.1–46.3)
EOSINOPHIL # BLD AUTO: 0 X10(3) UL (ref 0–0.7)
EOSINOPHIL NFR BLD AUTO: 0 %
ERYTHROCYTE [DISTWIDTH] IN BLOOD BY AUTOMATED COUNT: 13.5 % (ref 11–15)
GLUCOSE BLD-MCNC: 127 MG/DL (ref 70–99)
HCT VFR BLD AUTO: 49.5 %
HDLC SERPL-MCNC: 35 MG/DL (ref 40–59)
HGB BLD-MCNC: 15.6 G/DL
IMM GRANULOCYTES # BLD AUTO: 0.06 X10(3) UL (ref 0–1)
IMM GRANULOCYTES NFR BLD: 0.6 %
LACTATE BLD-SCNC: 10.7 MMOL/L (ref 0.5–2)
LDLC SERPL CALC-MCNC: 117 MG/DL (ref ?–100)
LYMPHOCYTES # BLD AUTO: 0.96 X10(3) UL (ref 1–4)
LYMPHOCYTES NFR BLD AUTO: 10 %
MCH RBC QN AUTO: 28.5 PG (ref 26–34)
MCHC RBC AUTO-ENTMCNC: 31.5 G/DL (ref 31–37)
MCV RBC AUTO: 90.5 FL
MODIFIED ALLEN TEST: POSITIVE
MONOCYTES # BLD AUTO: 0.42 X10(3) UL (ref 0.1–1)
NEUTROPHILS # BLD AUTO: 8.15 X10 (3) UL (ref 1.5–7.7)
NEUTROPHILS # BLD AUTO: 8.15 X10(3) UL (ref 1.5–7.7)
NEUTROPHILS NFR BLD AUTO: 84.8 %
NONHDLC SERPL-MCNC: 138 MG/DL (ref ?–130)
NT-PROBNP SERPL-MCNC: 3705 PG/ML (ref ?–450)
O2 CT BLD-SCNC: 19.5 VOL% (ref 15–23)
O2/TOTAL GAS SETTING VFR VENT: 100 %
OSMOLALITY SERPL CALC.SUM OF ELEC: 299 MOSM/KG (ref 275–295)
PCO2 BLDA: 21 MM HG (ref 35–45)
PH BLDA: 7.36 [PH] (ref 7.35–7.45)
PLATELET # BLD AUTO: 327 10(3)UL (ref 150–450)
PO2 BLDA: 62 MM HG (ref 80–100)
POTASSIUM SERPL-SCNC: 2.9 MMOL/L (ref 3.5–5.1)
PUNCTURE CHARGE: YES
RBC # BLD AUTO: 5.47 X10(6)UL
SAO2 % BLDA: 91.6 % (ref 94–100)
SARS-COV-2 RNA RESP QL NAA+PROBE: NOT DETECTED
SODIUM SERPL-SCNC: 140 MMOL/L (ref 136–145)
TRIGL SERPL-MCNC: 115 MG/DL (ref 30–149)
TROPONIN I HIGH SENSITIVITY: 100 NG/L
VLDLC SERPL CALC-MCNC: 20 MG/DL (ref 0–30)
WBC # BLD AUTO: 9.6 X10(3) UL (ref 4–11)

## 2022-01-01 PROCEDURE — 93005 ELECTROCARDIOGRAM TRACING: CPT

## 2022-01-01 PROCEDURE — 80048 BASIC METABOLIC PNL TOTAL CA: CPT | Performed by: EMERGENCY MEDICINE

## 2022-01-01 PROCEDURE — 80048 BASIC METABOLIC PNL TOTAL CA: CPT

## 2022-01-01 PROCEDURE — 99291 CRITICAL CARE FIRST HOUR: CPT

## 2022-01-01 PROCEDURE — 84484 ASSAY OF TROPONIN QUANT: CPT | Performed by: EMERGENCY MEDICINE

## 2022-01-01 PROCEDURE — 83880 ASSAY OF NATRIURETIC PEPTIDE: CPT

## 2022-01-01 PROCEDURE — 80061 LIPID PANEL: CPT | Performed by: EMERGENCY MEDICINE

## 2022-01-01 PROCEDURE — 94002 VENT MGMT INPAT INIT DAY: CPT

## 2022-01-01 PROCEDURE — 82805 BLOOD GASES W/O2 SATURATION: CPT | Performed by: EMERGENCY MEDICINE

## 2022-01-01 PROCEDURE — 85025 COMPLETE CBC W/AUTO DIFF WBC: CPT | Performed by: EMERGENCY MEDICINE

## 2022-01-01 PROCEDURE — 96365 THER/PROPH/DIAG IV INF INIT: CPT

## 2022-01-01 PROCEDURE — 84484 ASSAY OF TROPONIN QUANT: CPT

## 2022-01-01 PROCEDURE — 83880 ASSAY OF NATRIURETIC PEPTIDE: CPT | Performed by: EMERGENCY MEDICINE

## 2022-01-01 PROCEDURE — 36600 WITHDRAWAL OF ARTERIAL BLOOD: CPT | Performed by: EMERGENCY MEDICINE

## 2022-01-01 PROCEDURE — 71045 X-RAY EXAM CHEST 1 VIEW: CPT | Performed by: EMERGENCY MEDICINE

## 2022-01-01 PROCEDURE — 93010 ELECTROCARDIOGRAM REPORT: CPT | Performed by: EMERGENCY MEDICINE

## 2022-01-01 PROCEDURE — 96361 HYDRATE IV INFUSION ADD-ON: CPT

## 2022-01-01 PROCEDURE — 83605 ASSAY OF LACTIC ACID: CPT | Performed by: EMERGENCY MEDICINE

## 2022-01-01 PROCEDURE — 85025 COMPLETE CBC W/AUTO DIFF WBC: CPT

## 2022-01-01 PROCEDURE — 36415 COLL VENOUS BLD VENIPUNCTURE: CPT | Performed by: EMERGENCY MEDICINE

## 2022-01-01 PROCEDURE — 94003 VENT MGMT INPAT SUBQ DAY: CPT

## 2022-01-01 PROCEDURE — 5A09357 ASSISTANCE WITH RESPIRATORY VENTILATION, LESS THAN 24 CONSECUTIVE HOURS, CONTINUOUS POSITIVE AIRWAY PRESSURE: ICD-10-PCS | Performed by: EMERGENCY MEDICINE

## 2022-01-01 RX ORDER — LORAZEPAM 2 MG/ML
1 INJECTION INTRAMUSCULAR
Status: DISCONTINUED | OUTPATIENT
Start: 2022-01-01 | End: 2022-01-01

## 2022-01-01 RX ORDER — SODIUM CHLORIDE 9 MG/ML
INJECTION, SOLUTION INTRAVENOUS CONTINUOUS
Status: DISCONTINUED | OUTPATIENT
Start: 2022-01-01 | End: 2022-01-01

## 2022-01-01 RX ORDER — DIPHENHYDRAMINE HCL 25 MG
50 CAPSULE ORAL ONCE
Status: DISCONTINUED | OUTPATIENT
Start: 2022-01-01 | End: 2022-01-01

## 2022-01-01 RX ORDER — PREDNISONE 20 MG/1
60 TABLET ORAL ONCE
Status: DISCONTINUED | OUTPATIENT
Start: 2022-01-01 | End: 2022-01-01

## 2022-01-01 RX ORDER — MORPHINE SULFATE 2 MG/ML
1 INJECTION, SOLUTION INTRAMUSCULAR; INTRAVENOUS
Status: DISCONTINUED | OUTPATIENT
Start: 2022-01-01 | End: 2022-01-01

## 2022-01-01 RX ORDER — ACETAMINOPHEN 650 MG/1
650 SUPPOSITORY RECTAL EVERY 6 HOURS PRN
Status: DISCONTINUED | OUTPATIENT
Start: 2022-01-01 | End: 2022-01-01

## 2022-02-11 PROBLEM — J96.01 ACUTE RESPIRATORY FAILURE WITH HYPOXIA (HCC): Status: ACTIVE | Noted: 2022-01-01

## 2022-02-12 PROBLEM — J69.0 ASPIRATION PNEUMONIA OF RIGHT LOWER LOBE, UNSPECIFIED ASPIRATION PNEUMONIA TYPE (HCC): Status: ACTIVE | Noted: 2022-01-01

## 2022-02-12 NOTE — ED QUICK NOTES
Per MD June Edwards, spoke with wife Kai Piedra and received verbal ok to make hospice/comfort care.

## 2022-02-12 NOTE — ED QUICK NOTES
MD Marck Hartman aware of decreasing blood pressure readings. Verbal order for 500cc bolus 0.9 NS. MD Marck Hartman to discuss POC with family via phone.

## 2022-02-12 NOTE — RESPIRATORY THERAPY NOTE
RT called to ED for BiPAP. Pt placed on BiPAP settings 14/6/100%. ABG done with critical results reported. Settings adjusted to 12/6  Pt transported from ED to floor with no incidents. Pt on BiPAP settings 12/6/100%  Pt tolerating and saturating appropriately. RT to continue to monitor.

## 2022-02-12 NOTE — SPIRITUAL CARE NOTE
Notified of expiration by Demetris Wilkes (Nurse)  Contacted Spouse Suzanna Sepulveda) via phone for authorization and f home info.    Completed release log for public safety

## 2022-02-12 NOTE — ED QUICK NOTES
Orders for admission, patient is aware of plan and ready to go upstairs. Any questions, please call ED RN Puma Davenport  at extension [phone].    Type of COVID test sent:  COVID Suspicion level: Low/High  Covid negative     Responds to pain, on bipap, DNR, hospice care

## 2022-02-24 NOTE — DISCHARGE SUMMARY
[place] [facility]    Discharge Summary    Gulshan Tyson Patient Status:  Inpatient    1945 MRN [id number]   Location [facility] 3W/SW Attending No att. providers found   Hosp Day # 1 PCP Christine Cleveland MD     Date of Admission: 2022 Disposition:      Date of Discharge: 2022  9:07 AM    Admitting Diagnosis: Acute respiratory failure with hypoxia (HCC) [J96.01]  Aspiration pneumonia of right lower lobe, unspecified aspiration pneumonia type ([address] [J69.0]    Discharge Diagnosis: Patient Active Problem List:     Overweight(278.02)     Obstructive sleep apnea (adult) (pediatric)     Other and unspecified hyperlipidemia     Unspecified essential hypertension     Other malignant neoplasm of skin, site unspecified     Acute on chronic congestive heart failure ([address]     Acute on chronic congestive heart failure, unspecified heart failure type ([address]     Venous stasis dermatitis of both lower extremities     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 Family decided for hospice, comfort care. Patient . Hospital Course:     Consultations:     Procedures:     Complications:     Discharge Condition:        **Certification    Admission date was 2022. Inpatient stay was shorter than expected. Patient's Acute respiratory failure with hypoxia (HCC) was initially serious enough to expect a more lengthy hospitalization but patient improved faster than expected. Discharge Medications:      Discharge Medications      You have not been prescribed any medications. Robin Hdz MD  2/24/2022  2:33 PM

## 2023-04-26 NOTE — ED NOTES
After removing dressing EMS placed, skin tear noted to right hand. S-shaped laceration noted to left posterior head. Call Center TCM Note    Flowsheet Row Responses   [facility] patient discharged from? Non-BH  [Signature Osmani Del Castillo]   Does the patient have one of the following disease processes/diagnoses(primary or secondary)? Other   TCM attempt successful? No   Unsuccessful attempts Attempt 1  [Outdated PCP verbal release]   Call Status Voice mail issues  [Mailbox full]          Crystal Carnes RN    4/26/2023, 11:11 EDT

## 2025-02-22 NOTE — PROGRESS NOTES
Patient seen in follow up. Off restraints now. Still confused. D/w RN. Chart reviewed.      10/23/21  0936   BP: 100/79   Pulse: 81   Resp: 18   Temp: 98 °F (36.7 °C)       Intake/Output Summary (Last 24 hours) at 10/23/2021 1343  Last data filed at 1 MG Oral Tablet Dispersible, Take 4 mg by mouth every 8 (eight) hours as needed for Nausea. sennosides-docusate (SENEXON-S) 8.6-50 MG Oral Tab, Take 1 tablet by mouth daily as needed for constipation.   risperiDONE 0.5 MG Oral Tab, Take 0.5 mg by mouth 2 (t hypertension, hyperlipidemia and obstructive sleep apnea. PLAN:     - Echo with normal systolic function. Diastology cannot be assessed but likely related to diastolic dysfunction.  - Decrease torsemide 20/d and add aldactone 25/d.   - Check BMP    Apara Mayuri

## (undated) NOTE — IP AVS SNAPSHOT
Patient Demographics     Address  [address] [place] [address] Phone  [phone] [facility])  [phone] [facility]      Emergency Contact(s)     Name Relation Home Work Mobile    Yumiko Tyson Spouse [phone]  [address] [place] [address] XL  Next dose due: Tomorrow morning      Take 1 tablet (25 mg total) by mouth Daily Beta Blocker.    Davis Simmons MD         ondansetron 4 MG Tbdp  Commonly known as: ZOFRAN-ODT  Next dose due: As needed      Take 4 mg by mouth every 8 (eight) hours as nee Tabs  spironolactone 25 MG Tabs     Please  your prescriptions at the location directed by your doctor or nurse    Bring a paper prescription for each of these medications  Divalproex Sodium 125 MG Csdr  metoprolol succinate 25 MG Tb24  torsemide 20 10/24/2021 0900   Pulse 81 Filed at 10/24/2021 0900   Resp 18 Filed at 10/24/2021 0900   Temp 98 °F (36.7 °C) Filed at 10/24/2021 0900   SpO2 94 % Filed at 10/24/2021 0900      Patient's Most Recent Weight       Most Recent Value   Patient Weight 85.9 kg ( Abbreviation Name Director Address Valid Date Range    162 - [facility]) [place] [facility] ([facility]) Lola Ngo. Aric Ba M.D. [facility]. [address]  [facility] [address] 03/19/20 1442 - Present            Microbiology Results (All)     P symptoms.     History:  Past Medical History:   Diagnosis Date   • Arthropathy, unspecified, site unspecified    • Dementia ([facility]    • Depression    • Esophageal reflux    • HYPERTENSION    • Obstructive sleep apnea (adult) (pediatric)    • Other and unspec bedtime. , Disp: , Rfl:   LONGS ADULT LOW STRENGTH ASA 81 MG OR TBEC, 1 TABLET PO DAILY, Disp: , Rfl:   TRIAMTERENE-HCTZ 37.5-25 MG OR CAPS, TAKE ONE CAPSULE BY MOUTH EVERY DAY, Disp: 90 Cap, Rfl: 1, Unknown at Unknown time  QUINAPRIL HCL 10 MG OR TABS, 1 T CO2 33.0 10/19/2021    GLU 94 10/19/2021    CA 8.5 10/19/2021       Imaging:  Reviewed     Assessment:  Patient Active Problem List:     Overweight(278.02)     Obstructive sleep apnea (adult) (pediatric)     Other and unspecified hyperlipidemia     Unsp Consultation:   Patient presented with delirium, Dr. Tiffanie Keys MD requested psychiatric consult for evaluation and advice. Consult Duration     The patient seen for initial psychiatric consult evaluation.    Record reviewed, communication with atten isolative at times but she reports no onset of depressive sx recently.  Irving Richard denies any known hx or onset of anxiety for Jennifer Araiza, however, the reports of his triggers to agitation seem to exhibit that of anxiety.      Irving Richard reports that [facility] has NOSE; [place] WITH DR Weinstein 10   • SLEEP APNEA    • Unspecified essential hypertension        Past Surgical History  Past Surgical History:   Procedure Laterality Date   • REMOVAL OF TONSILS,12+ Y/O     • SKIN SURGERY     • SPECIAL SERVICE OR REPORT  2002 BASAL (four) hours as needed for Anxiety. ondansetron 4 MG Oral Tablet Dispersible, Take 4 mg by mouth every 8 (eight) hours as needed for Nausea. sennosides-docusate (SENEXON-S) 8.6-50 MG Oral Tab, Take 1 tablet by mouth daily as needed for constipation.   ris Finalized by (CST): Evie Smith MD on 10/19/2021 at 9:20 AM          XR CHEST AP PORTABLE  (CPT=71045)    Result Date: 10/18/2021  CONCLUSION:   Cardiomegaly with mild vascular congestion.   No significant    Dictated by (CST): Kesha Norwood MD on 10 with behavioral disturbance ([facility] Episodic mood disorder.   Acute on chronic congestive heart failure, unspecified heart failure type (HCC)  Venous stasis dermatitis of both lower extremities      The patient with a chronic history of dementia and multipl 10/21/2021  5:10 PM through 10/24/2021  5:10 PM   No notes of this type exist for this encounter. Occupational Therapy Notes (last 72 hours)  Notes from 10/21/2021  5:10 PM through 10/24/2021  5:10 PM   No notes of this type exist for this encounter.

## (undated) NOTE — IP AVS SNAPSHOT
[facility]            (For Outpatient Use Only) Initial Admit Date: 10/18/2021   Inpt/Obs Admit Date: Inpt: 10/19/21 / Obs: N/A   Discharge Date:    Julisa Payne:  [de-identified]   MRN: [de-identified]   CSN: [id number]   CEID: [id number]        E Insurance Type:    Subscriber Name:  Subscriber :    Subscriber ID:  Pt Rel to Subscriber:    Hospital Account Financial Class: Medicare    2021